# Patient Record
Sex: FEMALE | Race: WHITE | Employment: UNEMPLOYED | URBAN - NONMETROPOLITAN AREA
[De-identification: names, ages, dates, MRNs, and addresses within clinical notes are randomized per-mention and may not be internally consistent; named-entity substitution may affect disease eponyms.]

---

## 2018-08-28 ENCOUNTER — OFFICE VISIT (OUTPATIENT)
Dept: ORTHOPEDIC SURGERY | Age: 58
End: 2018-08-28
Payer: MEDICAID

## 2018-08-28 VITALS
OXYGEN SATURATION: 98 % | HEART RATE: 57 BPM | WEIGHT: 171 LBS | SYSTOLIC BLOOD PRESSURE: 122 MMHG | DIASTOLIC BLOOD PRESSURE: 62 MMHG | HEIGHT: 63 IN | BODY MASS INDEX: 30.3 KG/M2

## 2018-08-28 DIAGNOSIS — G89.29 CHRONIC PAIN OF RIGHT KNEE: Primary | ICD-10-CM

## 2018-08-28 DIAGNOSIS — M25.561 CHRONIC PAIN OF RIGHT KNEE: Primary | ICD-10-CM

## 2018-08-28 PROCEDURE — 3017F COLORECTAL CA SCREEN DOC REV: CPT | Performed by: PHYSICIAN ASSISTANT

## 2018-08-28 PROCEDURE — G8427 DOCREV CUR MEDS BY ELIG CLIN: HCPCS | Performed by: PHYSICIAN ASSISTANT

## 2018-08-28 PROCEDURE — 99212 OFFICE O/P EST SF 10 MIN: CPT | Performed by: PHYSICIAN ASSISTANT

## 2018-08-28 PROCEDURE — G8417 CALC BMI ABV UP PARAM F/U: HCPCS | Performed by: PHYSICIAN ASSISTANT

## 2018-08-28 PROCEDURE — 1036F TOBACCO NON-USER: CPT | Performed by: PHYSICIAN ASSISTANT

## 2018-09-18 ENCOUNTER — OFFICE VISIT (OUTPATIENT)
Dept: ORTHOPEDIC SURGERY | Age: 58
End: 2018-09-18
Payer: COMMERCIAL

## 2018-09-18 VITALS
BODY MASS INDEX: 31.01 KG/M2 | SYSTOLIC BLOOD PRESSURE: 124 MMHG | WEIGHT: 175 LBS | HEIGHT: 63 IN | DIASTOLIC BLOOD PRESSURE: 70 MMHG | HEART RATE: 60 BPM

## 2018-09-18 DIAGNOSIS — M17.11 PRIMARY OSTEOARTHRITIS OF RIGHT KNEE: Primary | ICD-10-CM

## 2018-09-18 PROCEDURE — 99212 OFFICE O/P EST SF 10 MIN: CPT | Performed by: NURSE PRACTITIONER

## 2018-09-18 PROCEDURE — 20610 DRAIN/INJ JOINT/BURSA W/O US: CPT | Performed by: NURSE PRACTITIONER

## 2018-09-18 PROCEDURE — G8417 CALC BMI ABV UP PARAM F/U: HCPCS | Performed by: NURSE PRACTITIONER

## 2018-09-18 PROCEDURE — 3017F COLORECTAL CA SCREEN DOC REV: CPT | Performed by: NURSE PRACTITIONER

## 2018-09-18 PROCEDURE — G8427 DOCREV CUR MEDS BY ELIG CLIN: HCPCS | Performed by: NURSE PRACTITIONER

## 2018-09-18 PROCEDURE — 1036F TOBACCO NON-USER: CPT | Performed by: NURSE PRACTITIONER

## 2020-09-01 ENCOUNTER — HOSPITAL ENCOUNTER (OUTPATIENT)
Dept: GENERAL RADIOLOGY | Age: 60
Discharge: HOME | End: 2020-09-03
Payer: MEDICAID

## 2020-09-01 ENCOUNTER — OFFICE VISIT (OUTPATIENT)
Dept: ORTHOPEDIC SURGERY | Age: 60
End: 2020-09-01
Payer: MEDICAID

## 2020-09-01 VITALS
HEIGHT: 63 IN | SYSTOLIC BLOOD PRESSURE: 130 MMHG | WEIGHT: 175 LBS | BODY MASS INDEX: 31.01 KG/M2 | HEART RATE: 80 BPM | DIASTOLIC BLOOD PRESSURE: 80 MMHG

## 2020-09-01 PROCEDURE — 73562 X-RAY EXAM OF KNEE 3: CPT

## 2020-09-01 PROCEDURE — 99212 OFFICE O/P EST SF 10 MIN: CPT | Performed by: PHYSICIAN ASSISTANT

## 2020-09-01 PROCEDURE — 1036F TOBACCO NON-USER: CPT | Performed by: PHYSICIAN ASSISTANT

## 2020-09-01 PROCEDURE — 99213 OFFICE O/P EST LOW 20 MIN: CPT

## 2020-09-01 PROCEDURE — 3017F COLORECTAL CA SCREEN DOC REV: CPT | Performed by: PHYSICIAN ASSISTANT

## 2020-09-01 PROCEDURE — G8419 CALC BMI OUT NRM PARAM NOF/U: HCPCS | Performed by: PHYSICIAN ASSISTANT

## 2020-09-01 PROCEDURE — G8427 DOCREV CUR MEDS BY ELIG CLIN: HCPCS | Performed by: PHYSICIAN ASSISTANT

## 2020-09-01 RX ORDER — GEMFIBROZIL 600 MG/1
600 TABLET, FILM COATED ORAL 2 TIMES DAILY
COMMUNITY
Start: 2020-08-31 | End: 2021-03-16

## 2020-09-01 RX ORDER — LACTOBACILLUS ACIDOPHILUS 500MM CELL
1 CAPSULE ORAL DAILY
COMMUNITY

## 2020-09-01 RX ORDER — LISINOPRIL AND HYDROCHLOROTHIAZIDE 12.5; 1 MG/1; MG/1
1 TABLET ORAL DAILY
COMMUNITY
End: 2020-09-15

## 2020-09-01 RX ORDER — FAMOTIDINE 20 MG/1
20 TABLET, FILM COATED ORAL 2 TIMES DAILY
COMMUNITY
Start: 2020-08-18 | End: 2021-03-16

## 2020-09-01 NOTE — PROGRESS NOTES
Orthopaedic Progress Note      CHIEF COMPLAINT: Right knee pain    HISTORY OF PRESENT ILLNESS:       Ms. Shamir Restrepo  is a 64 y.o. female  who presents with a long history of ongoing right knee pain. We have seen her in this clinic on several different occasions. She has pain localized the anterior medial aspect of her right knee worse with any activity relieved with rest.  At time she had an MRI of her right knee revealed advanced significant cartilage loss medial compartment the knee. Since her last visit she is been in [place]. She had a right knee operative arthroscopy done down there. She also had Orthovisc injections done down there. States last one was several months ago. The pain is back 7 out of 10 on a pain scale localized the knee no radiation she is interested in having repeat Visco supplementation injections done at this time.       Past Medical History:    Past Medical History:   Diagnosis Date    Diabetes mellitus ([address]     GERD (gastroesophageal reflux disease)     Heart murmur     Hyperlipidemia     Hypertension     Mitral valve regurgitation     S/P arthroscopy of right knee        Past Surgical History:    Past Surgical History:   Procedure Laterality Date    BUNIONECTOMY Bilateral     CARPAL TUNNEL RELEASE Bilateral     CHOLECYSTECTOMY      COLONOSCOPY      FINGER TRIGGER RELEASE Left 2010    Left thumb    KNEE ARTHROSCOPY Right     w/ menisectomy    KNEE ARTHROSCOPY Right 01/24/2020         Current  Medications:  Current Outpatient Medications   Medication Sig Dispense Refill    metFORMIN (GLUCOPHAGE) 500 MG tablet Take 500 mg by mouth 2 times daily      gemfibrozil (LOPID) 600 MG tablet Take 600 mg by mouth 2 times daily      famotidine (PEPCID) 20 MG tablet Take 20 mg by mouth 2 times daily      lisinopril-hydroCHLOROthiazide (PRINZIDE;ZESTORETIC) 10-12.5 MG per tablet Take 1 tablet by mouth daily      Acidophilus Lactobacillus CAPS Take 1 tablet by mouth daily  Multiple Vitamin (MULTI VITAMIN DAILY PO) Take by mouth      atenolol (TENORMIN) 50 MG tablet Take 50 mg by mouth daily      loratadine (CLARITIN) 10 MG capsule Take 10 mg by mouth as needed       No current facility-administered medications for this visit. Allergies:  Fluticasone and Hydrocodone-acetaminophen    Social History:   Social History     Tobacco Use   Smoking Status Never Smoker   Smokeless Tobacco Never Used     Social History     Substance and Sexual Activity   Alcohol Use No     Social History     Substance and Sexual Activity   Drug Use No       Family History:  History reviewed. No pertinent family history. REVIEW OF SYSTEMS:  Constitutional: Denies any fever, chills. Musculoskeletal: Positive for chronic right knee pain. PHYSICAL EXAM:  [unfilled]  General appearance:  Alert and oriented x 3. No apparent distress, appears stated age, calm and cooperative. Musculoskeletal: Right knee was inspected skin is good repair there is no erythema increased warmth or cellulitis. Small joint effusion knee motion is full extension flexion around 120 degrees significant crepitus is noted throughout range of motion medial joint line tenderness noted palpation good stability varus valgus anterior and posterior stresses negative Lockman negative anterior drawer negative Samir's test she has no calf pain good total extension she neurovascular intact the foot. DATA:  CBC: No results found for: WBC, HGB, PLT  BMP:  No results found for: NA, K, CL, CO2, BUN, CREATININE, CALCIUM, GLUCOSE  PT/INR:  No results found for: PROTIME, INR  Troponin:  No results found for: TROPONINI  No results for input(s): LIPASE, AMYLASE in the last 72 hours. No results for input(s): AST, ALT, BILIDIR, BILITOT, ALKPHOS in the last 72 hours.   Uric Acid:  No components found for: URIC  Urine Culture:  No components found for: ZEKE    Radiology:   Xr Knee Right (3 Views)    Result Date: 9/1/2020  EXAMINATION: THREE XRAY VIEWS OF THE RIGHT KNEE 9/1/2020 7:59 am COMPARISON: None. HISTORY: ORDERING SYSTEM PROVIDED HISTORY: Chronic pain of right knee TECHNOLOGIST PROVIDED HISTORY: pain Reason for Exam: C/o chronic knee pain, hx torn meniscus Acuity: Chronic Type of Exam: Unknown FINDINGS: Mild medial compartmental joint space narrowing. Mild patellofemoral spurring. Trace patellofemoral effusion. No fracture identified. Mild bicompartmental degenerative changes. Trace patellofemoral effusion. No fracture identified. X-rays personally reviewed by me of her right knee 3 views reveal mild to moderate degenerative joint disease with no acute bony abnormalities appreciated. Diagnosis Orders   1. Chronic pain of right knee           PLAN:  At this point she would like to do repeat Visco supplementation injections in that right knee will need to get these approved through her insurance we will call her when it is done. No orders of the defined types were placed in this encounter.        Procedure:        Electronically signed by Radha Daniel PA-C on 9/1/2020 at 11:10 AM

## 2020-09-08 ENCOUNTER — OFFICE VISIT (OUTPATIENT)
Dept: ORTHOPEDIC SURGERY | Age: 60
End: 2020-09-08
Payer: MEDICAID

## 2020-09-08 VITALS
SYSTOLIC BLOOD PRESSURE: 135 MMHG | HEART RATE: 90 BPM | HEIGHT: 63 IN | BODY MASS INDEX: 31.01 KG/M2 | DIASTOLIC BLOOD PRESSURE: 82 MMHG | WEIGHT: 175 LBS

## 2020-09-08 PROCEDURE — 99213 OFFICE O/P EST LOW 20 MIN: CPT

## 2020-09-08 PROCEDURE — 99213 OFFICE O/P EST LOW 20 MIN: CPT | Performed by: NURSE PRACTITIONER

## 2020-09-08 PROCEDURE — 20610 DRAIN/INJ JOINT/BURSA W/O US: CPT | Performed by: NURSE PRACTITIONER

## 2020-09-08 PROCEDURE — 1036F TOBACCO NON-USER: CPT | Performed by: NURSE PRACTITIONER

## 2020-09-08 PROCEDURE — 3017F COLORECTAL CA SCREEN DOC REV: CPT | Performed by: NURSE PRACTITIONER

## 2020-09-08 PROCEDURE — G8427 DOCREV CUR MEDS BY ELIG CLIN: HCPCS | Performed by: NURSE PRACTITIONER

## 2020-09-08 PROCEDURE — G8417 CALC BMI ABV UP PARAM F/U: HCPCS | Performed by: NURSE PRACTITIONER

## 2020-09-08 RX ADMIN — Medication 30 MG: at 08:14

## 2020-09-08 NOTE — PROGRESS NOTES
Orthopaedic Progress Note      CHIEF COMPLAINT: Right knee pain    HISTORY OF PRESENT ILLNESS:       Ms. Dominic Ozuna  is a 64 y.o. female  who presents with right knee pain. She was approved through her insurance for an Orthovisc 1 injection into her right knee. She does have a history of obtaining these injections in the past.  She states her pain is worse with activity relieved with rest.  She did have a MRI of her right knee which did reveal advanced significant cartilage loss of medial compartments in the knee. She is also been seen in [place] for previous orthopedic visits also. She states she did have a right knee arthroscopy surgery in [place]. Pain is worse with prolonged activity walking standing going up and down stairs getting up from a seated position.       Past Medical History:    Past Medical History:   Diagnosis Date    Diabetes mellitus ([address]     GERD (gastroesophageal reflux disease)     Heart murmur     Hyperlipidemia     Hypertension     Mitral valve regurgitation     S/P arthroscopy of right knee        Past Surgical History:    Past Surgical History:   Procedure Laterality Date    BUNIONECTOMY Bilateral     CARPAL TUNNEL RELEASE Bilateral     CHOLECYSTECTOMY      COLONOSCOPY      FINGER TRIGGER RELEASE Left 2010    Left thumb    KNEE ARTHROSCOPY Right     w/ menisectomy    KNEE ARTHROSCOPY Right 01/24/2020         Current  Medications:  Current Outpatient Medications   Medication Sig Dispense Refill    metFORMIN (GLUCOPHAGE) 500 MG tablet Take 500 mg by mouth 2 times daily      gemfibrozil (LOPID) 600 MG tablet Take 600 mg by mouth 2 times daily      famotidine (PEPCID) 20 MG tablet Take 20 mg by mouth 2 times daily      lisinopril-hydroCHLOROthiazide (PRINZIDE;ZESTORETIC) 10-12.5 MG per tablet Take 1 tablet by mouth daily      Acidophilus Lactobacillus CAPS Take 1 tablet by mouth daily      Multiple Vitamin (MULTI VITAMIN DAILY PO) Take by mouth      atenolol (TENORMIN) 50 MG tablet Take 50 mg by mouth daily      loratadine (CLARITIN) 10 MG capsule Take 10 mg by mouth as needed       No current facility-administered medications for this visit. Allergies:  Fluticasone and Hydrocodone-acetaminophen    Social History:   Social History     Tobacco Use   Smoking Status Never Smoker   Smokeless Tobacco Never Used     Social History     Substance and Sexual Activity   Alcohol Use No     Social History     Substance and Sexual Activity   Drug Use No       Family History:  History reviewed. No pertinent family history. REVIEW OF SYSTEMS:  Constitutional: Denies any fever, chills. Musculoskeletal: Positive for right knee pain. PHYSICAL EXAM:  [unfilled]  General appearance:  Alert and oriented x 3. No apparent distress, appears stated age, calm and cooperative. Musculoskeletal: Right lower extremity was examined. Skin is intact no rashes lesions or drainage. No redness warmth or cellulitis. No joint effusion is noted. Good range of motion of the knee she can get full extension flexion to around 120 degrees. Crepitus noted with range of motion. Good stability to varus and valgus stress testing. Negative Lachemann's. Negative Samir's. No calf pain to palpation. Toe flexion and extension is intact. She has medial joint line tenderness to palpation. Dylan Saavedra DATA:  CBC: No results found for: WBC, HGB, PLT  BMP:  No results found for: NA, K, CL, CO2, BUN, CREATININE, CALCIUM, GLUCOSE  PT/INR:  No results found for: PROTIME, INR  Troponin:  No results found for: TROPONINI  No results for input(s): LIPASE, AMYLASE in the last 72 hours. No results for input(s): AST, ALT, BILIDIR, BILITOT, ALKPHOS in the last 72 hours. Uric Acid:  No components found for: URIC  Urine Culture:  No components found for: JOHNSONINE    Radiology:   Xr Knee Right (3 Views)    Result Date: 9/1/2020  EXAMINATION: THREE XRAY VIEWS OF THE RIGHT KNEE 9/1/2020 7:59 am COMPARISON: None. HISTORY: ORDERING SYSTEM PROVIDED HISTORY: Chronic pain of right knee TECHNOLOGIST PROVIDED HISTORY: pain Reason for Exam: C/o chronic knee pain, hx torn meniscus Acuity: Chronic Type of Exam: Unknown FINDINGS: Mild medial compartmental joint space narrowing. Mild patellofemoral spurring. Trace patellofemoral effusion. No fracture identified. Mild bicompartmental degenerative changes. Trace patellofemoral effusion. No fracture identified. X-rays personally reviewed by me of x-rays 3 views of the right knee does show medial compartment osteoarthritis. No acute fractures. Diagnosis Orders   1. Chronic pain of right knee  [place] ARTHROCENTESIS ASPIR&/INJ MAJOR JT/BURSA W/O US    hyaluronan (ORTHOVISC) 30 MG/2ML injection 30 mg         PLAN:  Plan at this time patient is approved for an Orthovisc 1 injection into her right knee. Plan for injection today. See her back next week for her second injection. Activities as tolerated weightbearing as tolerated. No orders of the defined types were placed in this encounter. Procedure: Right knee was prepped in sterile fashion with alcohol. A 22-gauge needle was introduced into the right inferolateral portal the knee. 5 mils of half percent Marcaine 80 mg of Kenalog were injected. Hemostasis achieved dry sterile bandage applied. Patient tolerated procedure well.       Electronically signed by NATHANIEL Freire CNP on 9/8/2020 at 7:54 AM

## 2020-09-15 ENCOUNTER — OFFICE VISIT (OUTPATIENT)
Dept: ORTHOPEDIC SURGERY | Age: 60
End: 2020-09-15
Payer: MEDICAID

## 2020-09-15 VITALS
HEIGHT: 63 IN | RESPIRATION RATE: 16 BRPM | OXYGEN SATURATION: 98 % | DIASTOLIC BLOOD PRESSURE: 92 MMHG | HEART RATE: 70 BPM | BODY MASS INDEX: 31.01 KG/M2 | SYSTOLIC BLOOD PRESSURE: 154 MMHG | WEIGHT: 175 LBS

## 2020-09-15 PROCEDURE — 1036F TOBACCO NON-USER: CPT | Performed by: PHYSICIAN ASSISTANT

## 2020-09-15 PROCEDURE — 99212 OFFICE O/P EST SF 10 MIN: CPT

## 2020-09-15 PROCEDURE — 3017F COLORECTAL CA SCREEN DOC REV: CPT | Performed by: PHYSICIAN ASSISTANT

## 2020-09-15 PROCEDURE — 20610 DRAIN/INJ JOINT/BURSA W/O US: CPT | Performed by: PHYSICIAN ASSISTANT

## 2020-09-15 PROCEDURE — 99212 OFFICE O/P EST SF 10 MIN: CPT | Performed by: PHYSICIAN ASSISTANT

## 2020-09-15 PROCEDURE — G8427 DOCREV CUR MEDS BY ELIG CLIN: HCPCS | Performed by: PHYSICIAN ASSISTANT

## 2020-09-15 PROCEDURE — G8417 CALC BMI ABV UP PARAM F/U: HCPCS | Performed by: PHYSICIAN ASSISTANT

## 2020-09-15 RX ADMIN — Medication 30 MG: at 12:44

## 2020-09-15 SDOH — HEALTH STABILITY: MENTAL HEALTH: HOW OFTEN DO YOU HAVE A DRINK CONTAINING ALCOHOL?: NEVER

## 2020-09-15 NOTE — PROGRESS NOTES
Orthopaedic Progress Note      CHIEF COMPLAINT: Right knee osteoarthritis    HISTORY OF PRESENT ILLNESS:       Ms. Kim Barrera  is a 64 y.o. female  who presents with right knee osteoarthritis would like to proceed with Orthovisc No. 2 of the series of 3 patient stated that she did get mild relief from the first injection. Past Medical History:    Past Medical History:   Diagnosis Date    Diabetes mellitus ([address]     GERD (gastroesophageal reflux disease)     Heart murmur     Hyperlipidemia     Hypertension     Mitral valve regurgitation     S/P arthroscopy of right knee        Past Surgical History:    Past Surgical History:   Procedure Laterality Date    BUNIONECTOMY Bilateral     CARPAL TUNNEL RELEASE Bilateral     CHOLECYSTECTOMY      COLONOSCOPY      FINGER TRIGGER RELEASE Left 2010    Left thumb    KNEE ARTHROSCOPY Right     w/ menisectomy    KNEE ARTHROSCOPY Right 01/24/2020         Current  Medications:  Current Outpatient Medications   Medication Sig Dispense Refill    metFORMIN (GLUCOPHAGE) 500 MG tablet Take 500 mg by mouth 2 times daily      gemfibrozil (LOPID) 600 MG tablet Take 600 mg by mouth 2 times daily      famotidine (PEPCID) 20 MG tablet Take 20 mg by mouth 2 times daily      Acidophilus Lactobacillus CAPS Take 1 tablet by mouth daily      Multiple Vitamin (MULTI VITAMIN DAILY PO) Take by mouth      atenolol (TENORMIN) 50 MG tablet Take 50 mg by mouth daily      loratadine (CLARITIN) 10 MG capsule Take 10 mg by mouth as needed       No current facility-administered medications for this visit.         Allergies:  Fluticasone and Hydrocodone-acetaminophen    Social History:   Social History     Tobacco Use   Smoking Status Never Smoker   Smokeless Tobacco Never Used     Social History     Substance and Sexual Activity   Alcohol Use No    Frequency: Never    Binge frequency: Never     Social History     Substance and Sexual Activity   Drug Use No       Family History:  No family history on file. REVIEW OF SYSTEMS:  Constitutional: Denies any fever, chills. Musculoskeletal: Positive for osteoarthritis right knee. PHYSICAL EXAM:  [unfilled]  General appearance:  Alert and oriented x 3. No apparent distress, appears stated age, calm and cooperative. Musculoskeletal: Pain with range of motion crepitus noted flexion-extension. DATA:  CBC: No results found for: WBC, HGB, PLT  BMP:  No results found for: NA, K, CL, CO2, BUN, CREATININE, CALCIUM, GLUCOSE  PT/INR:  No results found for: PROTIME, INR  Troponin:  No results found for: TROPONINI  No results for input(s): LIPASE, AMYLASE in the last 72 hours. No results for input(s): AST, ALT, BILIDIR, BILITOT, ALKPHOS in the last 72 hours. Uric Acid:  No components found for: URIC  Urine Culture:  No components found for: CURINE    Radiology:   Xr Knee Right (3 Views)    Result Date: 9/1/2020  EXAMINATION: THREE XRAY VIEWS OF THE RIGHT KNEE 9/1/2020 7:59 am COMPARISON: None. HISTORY: ORDERING SYSTEM PROVIDED HISTORY: Chronic pain of right knee TECHNOLOGIST PROVIDED HISTORY: pain Reason for Exam: C/o chronic knee pain, hx torn meniscus Acuity: Chronic Type of Exam: Unknown FINDINGS: Mild medial compartmental joint space narrowing. Mild patellofemoral spurring. Trace patellofemoral effusion. No fracture identified. Mild bicompartmental degenerative changes. Trace patellofemoral effusion. No fracture identified. X-rays personally reviewed by me of osteoarthritis       Diagnosis Orders   1. Chronic pain of right knee  [place] ARTHROCENTESIS ASPIR&/INJ MAJOR JT/BURSA W/O US    hyaluronan (ORTHOVISC) 30 MG/2ML injection 30 mg         PLAN:  This time to proceed with Orthovisc no. 2 of the series of 3    No orders of the defined types were placed in this encounter.        Procedure:    Risk benefits discussed with patient patient like to proceed patient prepped in sterile fashion with alcohol Betadine Orthovisc No. 2 of the series of 3 was injected into the right inferolateral portal patient handled procedure well    Electronically signed by Anjali Worthy PA-C on 9/15/2020 at 12:22 PM

## 2020-09-22 ENCOUNTER — OFFICE VISIT (OUTPATIENT)
Dept: ORTHOPEDIC SURGERY | Age: 60
End: 2020-09-22
Payer: MEDICAID

## 2020-09-22 VITALS
HEART RATE: 70 BPM | DIASTOLIC BLOOD PRESSURE: 64 MMHG | WEIGHT: 175 LBS | SYSTOLIC BLOOD PRESSURE: 122 MMHG | HEIGHT: 63 IN | OXYGEN SATURATION: 98 % | BODY MASS INDEX: 31.01 KG/M2

## 2020-09-22 PROCEDURE — 3017F COLORECTAL CA SCREEN DOC REV: CPT | Performed by: PHYSICIAN ASSISTANT

## 2020-09-22 PROCEDURE — 99212 OFFICE O/P EST SF 10 MIN: CPT | Performed by: PHYSICIAN ASSISTANT

## 2020-09-22 PROCEDURE — 20610 DRAIN/INJ JOINT/BURSA W/O US: CPT | Performed by: PHYSICIAN ASSISTANT

## 2020-09-22 PROCEDURE — G8417 CALC BMI ABV UP PARAM F/U: HCPCS | Performed by: PHYSICIAN ASSISTANT

## 2020-09-22 PROCEDURE — 99213 OFFICE O/P EST LOW 20 MIN: CPT

## 2020-09-22 PROCEDURE — G8427 DOCREV CUR MEDS BY ELIG CLIN: HCPCS | Performed by: PHYSICIAN ASSISTANT

## 2020-09-22 PROCEDURE — 1036F TOBACCO NON-USER: CPT | Performed by: PHYSICIAN ASSISTANT

## 2020-09-22 RX ADMIN — Medication 30 MG: at 08:25

## 2020-09-22 NOTE — PROGRESS NOTES
Orthopaedic Progress Note      CHIEF COMPLAINT: Right knee pain    HISTORY OF PRESENT ILLNESS:       Ms. Nnamdi Chavez  is a 64 y.o. female  who presents with a long history of ongoing right knee pain. She has a diagnosis of primary osteoarthritis of the right knee. He is here today for her third in a series of 3 Orthovisc injections into the right knee. He states that these of helped significantly so far she would like to proceed with the third injection. Past Medical History:    Past Medical History:   Diagnosis Date    Diabetes mellitus ([address]     GERD (gastroesophageal reflux disease)     Heart murmur     Hyperlipidemia     Hypertension     Mitral valve regurgitation     S/P arthroscopy of right knee        Past Surgical History:    Past Surgical History:   Procedure Laterality Date    BUNIONECTOMY Bilateral     CARPAL TUNNEL RELEASE Bilateral     CHOLECYSTECTOMY      COLONOSCOPY      FINGER TRIGGER RELEASE Left 2010    Left thumb    KNEE ARTHROSCOPY Right     w/ menisectomy    KNEE ARTHROSCOPY Right 01/24/2020         Current  Medications:  Current Outpatient Medications   Medication Sig Dispense Refill    metFORMIN (GLUCOPHAGE) 500 MG tablet Take 500 mg by mouth 2 times daily      gemfibrozil (LOPID) 600 MG tablet Take 600 mg by mouth 2 times daily      famotidine (PEPCID) 20 MG tablet Take 20 mg by mouth 2 times daily      Acidophilus Lactobacillus CAPS Take 1 tablet by mouth daily      Multiple Vitamin (MULTI VITAMIN DAILY PO) Take by mouth      atenolol (TENORMIN) 50 MG tablet Take 100 mg by mouth daily       loratadine (CLARITIN) 10 MG capsule Take 10 mg by mouth as needed       No current facility-administered medications for this visit.         Allergies:  Fluticasone and Hydrocodone-acetaminophen    Social History:   Social History     Tobacco Use   Smoking Status Never Smoker   Smokeless Tobacco Never Used     Social History     Substance and Sexual Activity   Alcohol Use No    Frequency: Never    Binge frequency: Never     Social History     Substance and Sexual Activity   Drug Use No       Family History:  History reviewed. No pertinent family history. REVIEW OF SYSTEMS:  Constitutional: Denies any fever, chills. Musculoskeletal: Positive for right knee osteoarthritis. PHYSICAL EXAM:  [unfilled]  General appearance:  Alert and oriented x 3. No apparent distress, appears stated age, calm and cooperative. Musculoskeletal: Right knee was inspected skin is good repair no erythema increased warmth or cellulitis. Lee Mckenna DATA:  CBC: No results found for: WBC, HGB, PLT  BMP:  No results found for: NA, K, CL, CO2, BUN, CREATININE, CALCIUM, GLUCOSE  PT/INR:  No results found for: PROTIME, INR  Troponin:  No results found for: TROPONINI  No results for input(s): LIPASE, AMYLASE in the last 72 hours. No results for input(s): AST, ALT, BILIDIR, BILITOT, ALKPHOS in the last 72 hours. Uric Acid:  No components found for: URIC  Urine Culture:  No components found for: CURINE    Radiology:   Xr Knee Right (3 Views)    Result Date: 9/1/2020  EXAMINATION: THREE XRAY VIEWS OF THE RIGHT KNEE 9/1/2020 7:59 am COMPARISON: None. HISTORY: ORDERING SYSTEM PROVIDED HISTORY: Chronic pain of right knee TECHNOLOGIST PROVIDED HISTORY: pain Reason for Exam: C/o chronic knee pain, hx torn meniscus Acuity: Chronic Type of Exam: Unknown FINDINGS: Mild medial compartmental joint space narrowing. Mild patellofemoral spurring. Trace patellofemoral effusion. No fracture identified. Mild bicompartmental degenerative changes. Trace patellofemoral effusion. No fracture identified. X-rays personally reviewed by me of reveal primary osteoarthritis of the right knee. Diagnosis Orders   1.  Chronic pain of right knee  [place] ARTHROCENTESIS ASPIR&/INJ MAJOR JT/BURSA W/O US    hyaluronan (ORTHOVISC) 30 MG/2ML injection 30 mg         PLAN:  Proceed with a Orthovisc injection #3    No orders of the defined types were placed in this encounter.        Procedure:    Utilizing sterile technique the anterolateral portal of the right knee was washed with alcohol pad a 22-gauge needle was used to introduce standard prefilled Orthovisc syringe into the right knee joint was well-tolerated good hemostasis no dry sterile bandage applied    Electronically signed by Santana Shearer PA-C on 9/22/2020 at 8:17 AM

## 2021-03-16 ENCOUNTER — OFFICE VISIT (OUTPATIENT)
Dept: ORTHOPEDIC SURGERY | Age: 61
End: 2021-03-16
Payer: COMMERCIAL

## 2021-03-16 VITALS
SYSTOLIC BLOOD PRESSURE: 128 MMHG | DIASTOLIC BLOOD PRESSURE: 80 MMHG | BODY MASS INDEX: 31.01 KG/M2 | WEIGHT: 175 LBS | HEART RATE: 57 BPM | HEIGHT: 63 IN

## 2021-03-16 DIAGNOSIS — G89.29 CHRONIC PAIN OF RIGHT KNEE: Primary | ICD-10-CM

## 2021-03-16 DIAGNOSIS — M25.561 CHRONIC PAIN OF RIGHT KNEE: Primary | ICD-10-CM

## 2021-03-16 PROCEDURE — G8484 FLU IMMUNIZE NO ADMIN: HCPCS | Performed by: PHYSICIAN ASSISTANT

## 2021-03-16 PROCEDURE — 99213 OFFICE O/P EST LOW 20 MIN: CPT | Performed by: PHYSICIAN ASSISTANT

## 2021-03-16 PROCEDURE — G8417 CALC BMI ABV UP PARAM F/U: HCPCS | Performed by: PHYSICIAN ASSISTANT

## 2021-03-16 PROCEDURE — 99214 OFFICE O/P EST MOD 30 MIN: CPT

## 2021-03-16 PROCEDURE — 3017F COLORECTAL CA SCREEN DOC REV: CPT | Performed by: PHYSICIAN ASSISTANT

## 2021-03-16 PROCEDURE — 1036F TOBACCO NON-USER: CPT | Performed by: PHYSICIAN ASSISTANT

## 2021-03-16 PROCEDURE — G8427 DOCREV CUR MEDS BY ELIG CLIN: HCPCS | Performed by: PHYSICIAN ASSISTANT

## 2021-03-16 RX ORDER — GEMFIBROZIL 600 MG/1
600 TABLET, FILM COATED ORAL 2 TIMES DAILY
COMMUNITY
Start: 2020-08-31

## 2021-03-16 RX ORDER — CARVEDILOL 25 MG/1
25 TABLET ORAL 2 TIMES DAILY
COMMUNITY

## 2021-03-16 RX ORDER — ASPIRIN 81 MG/1
81 TABLET ORAL DAILY
COMMUNITY

## 2021-03-16 RX ORDER — FAMOTIDINE 40 MG/1
40 TABLET, FILM COATED ORAL 2 TIMES DAILY
COMMUNITY
Start: 2021-03-11 | End: 2022-03-11

## 2021-03-16 RX ORDER — ROSUVASTATIN CALCIUM 10 MG/1
10 TABLET, COATED ORAL DAILY
COMMUNITY

## 2021-03-16 NOTE — PROGRESS NOTES
Orthopaedic Progress Note      CHIEF COMPLAINT: Right knee pain    HISTORY OF PRESENT ILLNESS:       Ms. Dimitrios Herman  is a 64 y.o. female  who presents with history of osteoarthritis right knee she is underwent Orthovisc injections back in September 2020. She would like to proceed with another round of injections at this time. She did state that that helped out significantly. Past Medical History:    Past Medical History:   Diagnosis Date    Diabetes mellitus ([address]     GERD (gastroesophageal reflux disease)     Heart murmur     Hyperlipidemia     Hypertension     Mitral valve regurgitation     S/P arthroscopy of right knee        Past Surgical History:    Past Surgical History:   Procedure Laterality Date    BUNIONECTOMY Bilateral     CARPAL TUNNEL RELEASE Bilateral     CHOLECYSTECTOMY      COLONOSCOPY      FINGER TRIGGER RELEASE Left 2010    Left thumb    KNEE ARTHROSCOPY Right     w/ menisectomy    KNEE ARTHROSCOPY Right 01/24/2020         Current  Medications:  Current Outpatient Medications   Medication Sig Dispense Refill    aspirin 81 MG EC tablet Take 81 mg by mouth daily      Biotin 81726 MCG TBDP Take 1 tablet by mouth daily      carvedilol (COREG) 25 MG tablet Take 25 mg by mouth 2 times daily      famotidine (PEPCID) 40 MG tablet Take 40 mg by mouth 2 times daily      gemfibrozil (LOPID) 600 MG tablet Take 600 mg by mouth 2 times daily      metFORMIN (GLUCOPHAGE) 500 MG tablet Take 500 mg by mouth      lipase-protease-amylase (CREON) 85433-43124 units delayed release capsule Take 1 capsule by mouth      rosuvastatin (CRESTOR) 10 MG tablet Take 10 mg by mouth daily      Acidophilus Lactobacillus CAPS Take 1 tablet by mouth daily      Multiple Vitamin (MULTI VITAMIN DAILY PO) Take by mouth      loratadine (CLARITIN) 10 MG capsule Take 10 mg by mouth as needed       No current facility-administered medications for this visit.         Allergies:  Fluticasone and Hydrocodone-acetaminophen    Social History:   Social History     Tobacco Use   Smoking Status Never Smoker   Smokeless Tobacco Never Used     Social History     Substance and Sexual Activity   Alcohol Use No    Frequency: Never    Binge frequency: Never     Social History     Substance and Sexual Activity   Drug Use No       Family History:  History reviewed. No pertinent family history. REVIEW OF SYSTEMS:  Constitutional: Denies any fever, chills. Musculoskeletal: Positive for right knee pain. PHYSICAL EXAM:  [unfilled]  General appearance:  Alert and oriented x 3. No apparent distress, appears stated age, calm and cooperative. Musculoskeletal: Increasing pain getting up and seated position does have tenderness palpation along the medial joint line. Does have significant crepitus of flexion-extension knee negative valgus varus stress testing. DATA:  CBC: No results found for: WBC, HGB, PLT  BMP:  No results found for: NA, K, CL, CO2, BUN, CREATININE, CALCIUM, GLUCOSE  PT/INR:  No results found for: PROTIME, INR  Troponin:  No results found for: TROPONINI  No results for input(s): LIPASE, AMYLASE in the last 72 hours. No results for input(s): AST, ALT, BILIDIR, BILITOT, ALKPHOS in the last 72 hours. Uric Acid:  No components found for: URIC  Urine Culture:  No components found for: CURINE    Radiology:   No results found. X-rays personally reviewed by me of osteoarthritis primary right knee medial compartment       Diagnosis Orders   1. Chronic pain of right knee           PLAN:  This time feel this patient benefit from undergoing Orthovisc injection risk benefits cussed with patient patient like to proceed we will see if this can get this approved through her insurance once is completed we will discuss getting her set up for the injections    No orders of the defined types were placed in this encounter.        Procedure:        Electronically signed by Mary Ellen Keating PA-C on 3/16/2021 at 9:39 AM

## 2021-04-20 ENCOUNTER — OFFICE VISIT (OUTPATIENT)
Dept: ORTHOPEDIC SURGERY | Age: 61
End: 2021-04-20
Payer: COMMERCIAL

## 2021-04-20 VITALS
SYSTOLIC BLOOD PRESSURE: 131 MMHG | HEART RATE: 85 BPM | HEIGHT: 63 IN | BODY MASS INDEX: 31.01 KG/M2 | DIASTOLIC BLOOD PRESSURE: 80 MMHG | WEIGHT: 175 LBS

## 2021-04-20 DIAGNOSIS — M25.561 CHRONIC PAIN OF RIGHT KNEE: Primary | ICD-10-CM

## 2021-04-20 DIAGNOSIS — G89.29 CHRONIC PAIN OF RIGHT KNEE: Primary | ICD-10-CM

## 2021-04-20 PROCEDURE — G8417 CALC BMI ABV UP PARAM F/U: HCPCS | Performed by: PHYSICIAN ASSISTANT

## 2021-04-20 PROCEDURE — 20610 DRAIN/INJ JOINT/BURSA W/O US: CPT | Performed by: PHYSICIAN ASSISTANT

## 2021-04-20 PROCEDURE — 99214 OFFICE O/P EST MOD 30 MIN: CPT | Performed by: ORTHOPAEDIC SURGERY

## 2021-04-20 PROCEDURE — 1036F TOBACCO NON-USER: CPT | Performed by: PHYSICIAN ASSISTANT

## 2021-04-20 PROCEDURE — G8427 DOCREV CUR MEDS BY ELIG CLIN: HCPCS | Performed by: PHYSICIAN ASSISTANT

## 2021-04-20 PROCEDURE — 99213 OFFICE O/P EST LOW 20 MIN: CPT | Performed by: PHYSICIAN ASSISTANT

## 2021-04-20 PROCEDURE — 3017F COLORECTAL CA SCREEN DOC REV: CPT | Performed by: PHYSICIAN ASSISTANT

## 2021-04-20 RX ADMIN — Medication 30 MG: at 09:09

## 2021-04-20 NOTE — PROGRESS NOTES
Orthopaedic Progress Note      CHIEF COMPLAINT:  Right knee pain    HISTORY OF PRESENT ILLNESS:       Ms. Swapna Montana  is a 64 y.o. female  who presents with right knee primary OA. Here today for orthovisc injection series of three. Past Medical History:    Past Medical History:   Diagnosis Date    Diabetes mellitus ([address]     GERD (gastroesophageal reflux disease)     Heart murmur     Hyperlipidemia     Hypertension     Mitral valve regurgitation     S/P arthroscopy of right knee        Past Surgical History:    Past Surgical History:   Procedure Laterality Date    BUNIONECTOMY Bilateral     CARPAL TUNNEL RELEASE Bilateral     CHOLECYSTECTOMY      COLONOSCOPY      FINGER TRIGGER RELEASE Left 2010    Left thumb    KNEE ARTHROSCOPY Right     w/ menisectomy    KNEE ARTHROSCOPY Right 01/24/2020         Current  Medications:  Current Outpatient Medications   Medication Sig Dispense Refill    aspirin 81 MG EC tablet Take 81 mg by mouth daily      Biotin 86683 MCG TBDP Take 1 tablet by mouth daily      carvedilol (COREG) 25 MG tablet Take 25 mg by mouth 2 times daily      famotidine (PEPCID) 40 MG tablet Take 40 mg by mouth 2 times daily      gemfibrozil (LOPID) 600 MG tablet Take 600 mg by mouth 2 times daily      metFORMIN (GLUCOPHAGE) 500 MG tablet Take 500 mg by mouth      rosuvastatin (CRESTOR) 10 MG tablet Take 10 mg by mouth daily      Acidophilus Lactobacillus CAPS Take 1 tablet by mouth daily      Multiple Vitamin (MULTI VITAMIN DAILY PO) Take by mouth      loratadine (CLARITIN) 10 MG capsule Take 10 mg by mouth as needed      lipase-protease-amylase (CREON) 88259-87036 units delayed release capsule Take 1 capsule by mouth       No current facility-administered medications for this visit.         Allergies:  Fluticasone and Hydrocodone-acetaminophen    Social History:   Social History     Tobacco Use   Smoking Status Never Smoker   Smokeless Tobacco Never Used     Social History Substance and Sexual Activity   Alcohol Use No    Frequency: Never    Binge frequency: Never     Social History     Substance and Sexual Activity   Drug Use No       Family History:  No family history on file. REVIEW OF SYSTEMS:  Constitutional: Denies any fever, chills. Musculoskeletal: Positive for OA right knee. PHYSICAL EXAM:  [unfilled]  General appearance:  Alert and oriented x 3. No apparent distress, appears stated age, calm and cooperative. Musculoskeletal: Crepitus with right knee. Pain with range of motion as well. Negative valgus varus stress testing. Left knee was also examined having increasing crepitus with flexion-extension knee pain with any type of palpation at this time. Marta Groves DATA:  CBC: No results found for: WBC, HGB, PLT  BMP:  No results found for: NA, K, CL, CO2, BUN, CREATININE, CALCIUM, GLUCOSE  PT/INR:  No results found for: PROTIME, INR  Troponin:  No results found for: TROPONINI  No results for input(s): LIPASE, AMYLASE in the last 72 hours. No results for input(s): AST, ALT, BILIDIR, BILITOT, ALKPHOS in the last 72 hours. Uric Acid:  No components found for: URIC  Urine Culture:  No components found for: CURINE    Radiology:   No results found. X-rays personally reviewed by me of osteoarthritis right knee       Diagnosis Orders   1. Chronic pain of right knee  [place] ARTHROCENTESIS ASPIR&/INJ MAJOR JT/BURSA W/O US    hyaluronan (ORTHOVISC) 30 MG/2ML injection 30 mg         PLAN:  This time feel this patient benefit from undergoing Orthovisc injection right knee. To be first injection of the series of 3. Upon follow-up next week x-ray left knee 3 views standing    No orders of the defined types were placed in this encounter.        Procedure:    Patient prepped in sterile fashion with alcohol Orthovisc 1 the series of 3 was injected into the right inferolateral portal patient handled procedure well sterile Band-Aid was applied    Electronically signed by Katherine Dominique Yobany Darling PA-C on 4/20/2021 at 9:00 AM

## 2021-04-27 ENCOUNTER — OFFICE VISIT (OUTPATIENT)
Dept: ORTHOPEDIC SURGERY | Age: 61
End: 2021-04-27
Payer: COMMERCIAL

## 2021-04-27 ENCOUNTER — HOSPITAL ENCOUNTER (OUTPATIENT)
Dept: GENERAL RADIOLOGY | Age: 61
Discharge: HOME | End: 2021-04-29
Payer: COMMERCIAL

## 2021-04-27 VITALS
OXYGEN SATURATION: 97 % | HEIGHT: 63 IN | HEART RATE: 70 BPM | DIASTOLIC BLOOD PRESSURE: 84 MMHG | WEIGHT: 175 LBS | SYSTOLIC BLOOD PRESSURE: 122 MMHG | BODY MASS INDEX: 31.01 KG/M2

## 2021-04-27 DIAGNOSIS — M25.561 CHRONIC PAIN OF RIGHT KNEE: Primary | ICD-10-CM

## 2021-04-27 DIAGNOSIS — G89.29 CHRONIC PAIN OF RIGHT KNEE: Primary | ICD-10-CM

## 2021-04-27 DIAGNOSIS — M25.562 LEFT KNEE PAIN, UNSPECIFIED CHRONICITY: ICD-10-CM

## 2021-04-27 PROCEDURE — 20610 DRAIN/INJ JOINT/BURSA W/O US: CPT | Performed by: PHYSICIAN ASSISTANT

## 2021-04-27 PROCEDURE — 99213 OFFICE O/P EST LOW 20 MIN: CPT | Performed by: PHYSICIAN ASSISTANT

## 2021-04-27 PROCEDURE — 3017F COLORECTAL CA SCREEN DOC REV: CPT | Performed by: PHYSICIAN ASSISTANT

## 2021-04-27 PROCEDURE — 99214 OFFICE O/P EST MOD 30 MIN: CPT | Performed by: ORTHOPAEDIC SURGERY

## 2021-04-27 PROCEDURE — 1036F TOBACCO NON-USER: CPT | Performed by: PHYSICIAN ASSISTANT

## 2021-04-27 PROCEDURE — G8427 DOCREV CUR MEDS BY ELIG CLIN: HCPCS | Performed by: PHYSICIAN ASSISTANT

## 2021-04-27 PROCEDURE — 73562 X-RAY EXAM OF KNEE 3: CPT

## 2021-04-27 PROCEDURE — G8417 CALC BMI ABV UP PARAM F/U: HCPCS | Performed by: PHYSICIAN ASSISTANT

## 2021-04-27 RX ADMIN — Medication 30 MG: at 10:00

## 2021-04-27 NOTE — PROGRESS NOTES
Orthopaedic Progress Note      CHIEF COMPLAINT: Bilateral knee pain    HISTORY OF PRESENT ILLNESS:       Ms. Susie Arcos  is a 64 y.o. female  who presents with history of bilateral knee pain. She is underwent gel injection right knee x1 she is here today for second injection in the series of 3. She is also on proceed to get an x-ray of her left knee. She is having increasing difficulty getting up stabilization going up down steps.   She stated that she did not receive much benefit from the first injection of this right knee      Past Medical History:    Past Medical History:   Diagnosis Date    Diabetes mellitus ([address]     GERD (gastroesophageal reflux disease)     Heart murmur     Hyperlipidemia     Hypertension     Mitral valve regurgitation     S/P arthroscopy of right knee        Past Surgical History:    Past Surgical History:   Procedure Laterality Date    BUNIONECTOMY Bilateral     CARPAL TUNNEL RELEASE Bilateral     CHOLECYSTECTOMY      COLONOSCOPY      FINGER TRIGGER RELEASE Left 2010    Left thumb    KNEE ARTHROSCOPY Right     w/ menisectomy    KNEE ARTHROSCOPY Right 01/24/2020         Current  Medications:  Current Outpatient Medications   Medication Sig Dispense Refill    aspirin 81 MG EC tablet Take 81 mg by mouth daily      Biotin 65783 MCG TBDP Take 1 tablet by mouth daily      carvedilol (COREG) 25 MG tablet Take 25 mg by mouth 2 times daily      famotidine (PEPCID) 40 MG tablet Take 40 mg by mouth 2 times daily      gemfibrozil (LOPID) 600 MG tablet Take 600 mg by mouth 2 times daily      metFORMIN (GLUCOPHAGE) 500 MG tablet Take 500 mg by mouth      rosuvastatin (CRESTOR) 10 MG tablet Take 10 mg by mouth daily      Acidophilus Lactobacillus CAPS Take 1 tablet by mouth daily      Multiple Vitamin (MULTI VITAMIN DAILY PO) Take by mouth      loratadine (CLARITIN) 10 MG capsule Take 10 mg by mouth as needed      lipase-protease-amylase (CREON) 48273-55030 units delayed release capsule Take 1 capsule by mouth       No current facility-administered medications for this visit. Allergies:  Fluticasone and Hydrocodone-acetaminophen    Social History:   Social History     Tobacco Use   Smoking Status Never Smoker   Smokeless Tobacco Never Used     Social History     Substance and Sexual Activity   Alcohol Use No    Frequency: Never    Binge frequency: Never     Social History     Substance and Sexual Activity   Drug Use No       Family History:  History reviewed. No pertinent family history. REVIEW OF SYSTEMS:  Constitutional: Denies any fever, chills. Musculoskeletal: Positive for osteoarthritis primary bilateral knees. PHYSICAL EXAM:  [unfilled]  General appearance:  Alert and oriented x 3. No apparent distress, appears stated age, calm and cooperative. Musculoskeletal: Pain bilateral knees with range of motion crepitus noted with flexion-extension knees. DATA:  CBC: No results found for: WBC, HGB, PLT  BMP:  No results found for: NA, K, CL, CO2, BUN, CREATININE, CALCIUM, GLUCOSE  PT/INR:  No results found for: PROTIME, INR  Troponin:  No results found for: TROPONINI  No results for input(s): LIPASE, AMYLASE in the last 72 hours. No results for input(s): AST, ALT, BILIDIR, BILITOT, ALKPHOS in the last 72 hours. Uric Acid:  No components found for: URIC  Urine Culture:  No components found for: CURINE    Radiology:   No results found. X-rays pending for left knee right knee does show underlying osteoarthritis       Diagnosis Orders   1. Chronic pain of right knee  [place] ARTHROCENTESIS ASPIR&/INJ MAJOR JT/BURSA W/O US    hyaluronan (ORTHOVISC) 30 MG/2ML injection 30 mg   2. Left knee pain, unspecified chronicity  XR KNEE LEFT (3 VIEWS)         PLAN:  Proceed with x-rays left knee, injection right knee gel injection #2 the series of 3    No orders of the defined types were placed in this encounter.        Procedure:      Patient prepped in sterile fashion with alcohol Betadine viscosupplementation was injected into the right inferolateral portal patient had a procedure well steroids and applied  Electronically signed by Adryan Gordillo PA-C on 4/27/2021 at 9:51 AM

## 2021-05-04 ENCOUNTER — OFFICE VISIT (OUTPATIENT)
Dept: ORTHOPEDIC SURGERY | Age: 61
End: 2021-05-04
Payer: COMMERCIAL

## 2021-05-04 VITALS
SYSTOLIC BLOOD PRESSURE: 135 MMHG | HEART RATE: 70 BPM | HEIGHT: 63 IN | WEIGHT: 175 LBS | BODY MASS INDEX: 31.01 KG/M2 | DIASTOLIC BLOOD PRESSURE: 71 MMHG

## 2021-05-04 DIAGNOSIS — M25.562 LEFT KNEE PAIN, UNSPECIFIED CHRONICITY: ICD-10-CM

## 2021-05-04 DIAGNOSIS — M25.561 CHRONIC PAIN OF RIGHT KNEE: Primary | ICD-10-CM

## 2021-05-04 DIAGNOSIS — G89.29 CHRONIC PAIN OF RIGHT KNEE: Primary | ICD-10-CM

## 2021-05-04 PROCEDURE — 99214 OFFICE O/P EST MOD 30 MIN: CPT | Performed by: ORTHOPAEDIC SURGERY

## 2021-05-04 PROCEDURE — 20610 DRAIN/INJ JOINT/BURSA W/O US: CPT | Performed by: PHYSICIAN ASSISTANT

## 2021-05-04 PROCEDURE — G8427 DOCREV CUR MEDS BY ELIG CLIN: HCPCS | Performed by: PHYSICIAN ASSISTANT

## 2021-05-04 PROCEDURE — G8417 CALC BMI ABV UP PARAM F/U: HCPCS | Performed by: PHYSICIAN ASSISTANT

## 2021-05-04 PROCEDURE — 3017F COLORECTAL CA SCREEN DOC REV: CPT | Performed by: PHYSICIAN ASSISTANT

## 2021-05-04 PROCEDURE — 99212 OFFICE O/P EST SF 10 MIN: CPT | Performed by: PHYSICIAN ASSISTANT

## 2021-05-04 PROCEDURE — 1036F TOBACCO NON-USER: CPT | Performed by: PHYSICIAN ASSISTANT

## 2021-05-04 RX ADMIN — Medication 30 MG: at 09:38

## 2021-05-04 NOTE — PROGRESS NOTES
1 tablet by mouth daily      Multiple Vitamin (MULTI VITAMIN DAILY PO) Take by mouth      loratadine (CLARITIN) 10 MG capsule Take 10 mg by mouth as needed      lipase-protease-amylase (CREON) 32911-95040 units delayed release capsule Take 1 capsule by mouth       Current Facility-Administered Medications   Medication Dose Route Frequency Provider Last Rate Last Admin    hyaluronan (ORTHOVISC) 30 MG/2ML injection 30 mg  30 mg Intra-articular Once Joselo Jansen PA-C           Allergies:  Fluticasone and Hydrocodone-acetaminophen    Social History:   Social History     Tobacco Use   Smoking Status Never Smoker   Smokeless Tobacco Never Used     Social History     Substance and Sexual Activity   Alcohol Use No    Frequency: Never    Binge frequency: Never     Social History     Substance and Sexual Activity   Drug Use No       Family History:  History reviewed. No pertinent family history. REVIEW OF SYSTEMS:  Constitutional: Denies any fever, chills. Musculoskeletal: Positive for bilateral knee pain. PHYSICAL EXAM:  [unfilled]  General appearance:  Alert and oriented x 3. No apparent distress, appears stated age, calm and cooperative. Musculoskeletal: Right knee was inspected skin is good repair there is no erythema increased warmth or cellulitis. Small joint effusion noted. Knee motion is full extension flexion around 95 degrees crepitus noted throughout range of motion    Left knee was inspected skin is good repair no erythema increased warmth cellulitis small joint effusion noted. Knee motion of the left is full extension flexion 105 degrees. Natali Zunigaed DATA:  CBC: No results found for: WBC, HGB, PLT  BMP:  No results found for: NA, K, CL, CO2, BUN, CREATININE, CALCIUM, GLUCOSE  PT/INR:  No results found for: PROTIME, INR  Troponin:  No results found for: TROPONINI  No results for input(s): LIPASE, AMYLASE in the last 72 hours.   No results for input(s): AST, ALT, BILIDIR, BILITOT, ALKPHOS in the

## 2021-05-18 ENCOUNTER — OFFICE VISIT (OUTPATIENT)
Dept: ORTHOPEDIC SURGERY | Age: 61
End: 2021-05-18
Payer: COMMERCIAL

## 2021-05-18 VITALS
BODY MASS INDEX: 31.01 KG/M2 | HEIGHT: 63 IN | WEIGHT: 175 LBS | SYSTOLIC BLOOD PRESSURE: 126 MMHG | DIASTOLIC BLOOD PRESSURE: 79 MMHG | HEART RATE: 75 BPM

## 2021-05-18 DIAGNOSIS — M17.12 PRIMARY OSTEOARTHRITIS OF LEFT KNEE: Primary | ICD-10-CM

## 2021-05-18 PROCEDURE — 1036F TOBACCO NON-USER: CPT | Performed by: NURSE PRACTITIONER

## 2021-05-18 PROCEDURE — 99213 OFFICE O/P EST LOW 20 MIN: CPT | Performed by: NURSE PRACTITIONER

## 2021-05-18 PROCEDURE — 99212 OFFICE O/P EST SF 10 MIN: CPT | Performed by: ORTHOPAEDIC SURGERY

## 2021-05-18 PROCEDURE — G8417 CALC BMI ABV UP PARAM F/U: HCPCS | Performed by: NURSE PRACTITIONER

## 2021-05-18 PROCEDURE — 3017F COLORECTAL CA SCREEN DOC REV: CPT | Performed by: NURSE PRACTITIONER

## 2021-05-18 PROCEDURE — 20610 DRAIN/INJ JOINT/BURSA W/O US: CPT | Performed by: NURSE PRACTITIONER

## 2021-05-18 PROCEDURE — G8427 DOCREV CUR MEDS BY ELIG CLIN: HCPCS | Performed by: NURSE PRACTITIONER

## 2021-05-18 RX ADMIN — Medication 30 MG: at 13:41

## 2021-05-18 NOTE — PROGRESS NOTES
Orthopaedic Progress Note      CHIEF COMPLAINT: Bilateral knee pain    HISTORY OF PRESENT ILLNESS:       Ms. Mercedez Gill  is a 63 y.o. female  who presents with left knee pain. She has recently just completed a series of Orthovisc injections into her right knee. Patient states that after her last injection on May 4 she had continued to have pain to the right knee. She had not had pain like this before in the past after receiving injections. Instructed patient to give this a few more weeks to see if that pain will alleviate. She is here today for a left knee Orthovisc injection which she has been approved by her insurance.       Past Medical History:    Past Medical History:   Diagnosis Date    [address] mellitus [address] [address]     GERD (gastroesophageal reflux disease)     Heart murmur     Hyperlipidemia     Hypertension     Mitral valve regurgitation     S/P arthroscopy of right knee        Past Surgical History:    Past Surgical History:   Procedure Laterality Date    BUNIONECTOMY Bilateral     CARPAL TUNNEL RELEASE Bilateral     CHOLECYSTECTOMY      COLONOSCOPY      FINGER TRIGGER RELEASE Left 2010    Left thumb    KNEE ARTHROSCOPY Right     w/ menisectomy    KNEE ARTHROSCOPY Right 01/24/2020         Current  Medications:  Current Outpatient Medications   Medication Sig Dispense Refill    aspirin 81 MG EC tablet Take 81 mg by mouth daily      Biotin 41952 MCG TBDP Take 1 tablet by mouth daily      carvedilol (COREG) 25 MG tablet Take 25 mg by mouth 2 times daily      famotidine (PEPCID) 40 MG tablet Take 40 mg by mouth 2 times daily      gemfibrozil (LOPID) 600 MG tablet Take 600 mg by mouth 2 times daily      metFORMIN (GLUCOPHAGE) 500 MG tablet Take 500 mg by mouth      lipase-protease-amylase (CREON) 13534-99287 units delayed release capsule Take 1 capsule by mouth      rosuvastatin (CRESTOR) 10 MG tablet Take 10 mg by mouth daily      Acidophilus Lactobacillus CAPS Take 1 tablet by mouth daily      Multiple Vitamin (MULTI VITAMIN DAILY PO) Take by mouth      loratadine (CLARITIN) 10 MG capsule Take 10 mg by mouth as needed       Current Facility-Administered Medications   Medication Dose Route Frequency Provider Last Rate Last Admin    hyaluronan (ORTHOVISC) 30 MG/2ML injection 30 mg  30 mg Intra-articular Once Joelle Jiménez, APRN - CNP           Allergies:  Fluticasone and Hydrocodone-acetaminophen    Social History:   Social History     Tobacco Use   Smoking Status Never Smoker   Smokeless Tobacco Never Used     Social History     Substance and Sexual Activity   Alcohol Use No     Social History     Substance and Sexual Activity   Drug Use No       Family History:  History reviewed. No pertinent family history. REVIEW OF SYSTEMS:  Constitutional: Denies any fever, chills. Musculoskeletal: Positive for bilateral knee pain. PHYSICAL EXAM:  [unfilled]  General appearance:  Alert and oriented x 3. No apparent distress, appears stated age, calm and cooperative. Musculoskeletal: Left lower extremity was examined. Skin is intact no rashes lesions or drainage. No redness warmth or cellulitis noted. Mild joint effusion noted. Knee motion is full extension flexion to 105 degrees. Good stability varus and valgus stress testing. Right knee was examined skin is intact no rashes lesions or drainage. No redness warmth or cellulitis noted. Minimal pain with range of motion of the right knee. Toe flexion and extension is intact. [facility] DATA:  CBC: No results found for: WBC, HGB, PLT  BMP:  No results found for: NA, K, CL, CO2, BUN, CREATININE, CALCIUM, GLUCOSE  PT/INR:  No results found for: PROTIME, INR  Troponin:  No results found for: TROPONINI  No results for input(s): LIPASE, AMYLASE in the last 72 hours. No results for input(s): AST, ALT, BILIDIR, BILITOT, ALKPHOS in the last 72 hours.   Uric Acid:  No components found for: URIC  Urine Culture:  No components found for: ZEKE    Radiology:   XR KNEE LEFT (3 VIEWS)    Result Date: 4/27/2021  EXAMINATION: THREE XRAY VIEWS OF THE LEFT KNEE 4/27/2021 9:40 am COMPARISON: None HISTORY: ORDERING SYSTEM PROVIDED HISTORY: Left knee pain, unspecified chronicity TECHNOLOGIST PROVIDED HISTORY: pain Reason for Exam: left knee pain Acuity: Acute Type of Exam: Initial FINDINGS: No evidence of acute fracture or dislocation. Alignment appears intact. There is narrowing of the medial and lateral joint compartments. Radiographically there is no significant joint effusion. Minimal degenerative spurring. Mild degenerative changes. Diagnosis Orders   1. Primary osteoarthritis of left knee  [place] ARTHROCENTESIS ASPIR&/INJ MAJOR JT/BURSA W/O US    hyaluronan (ORTHOVISC) 30 MG/2ML injection 30 mg         PLAN:  Plan at this time we will proceed with a left knee Orthovisc injection today. We will see patient back next week for her second injection. Activities as tolerated weightbearing as tolerated. Procedures    [place] ARTHROCENTESIS ASPIR&/INJ MAJOR JT/BURSA W/O US        Procedure: The left knee was prepped in sterile fashion with alcohol. A 22-gauge needle was introduced into the left inferolateral portal of the knee with a prefilled Orthovisc syringe. Medication injected. Hemostasis achieved dry sterile bandage applied. Patient tolerated procedure well.       Electronically signed by NATHANIEL Maldonado CNP on 5/18/2021 at 1:04 PM

## 2021-05-25 ENCOUNTER — OFFICE VISIT (OUTPATIENT)
Dept: ORTHOPEDIC SURGERY | Age: 61
End: 2021-05-25
Payer: COMMERCIAL

## 2021-05-25 VITALS
HEART RATE: 79 BPM | DIASTOLIC BLOOD PRESSURE: 70 MMHG | SYSTOLIC BLOOD PRESSURE: 130 MMHG | WEIGHT: 175 LBS | BODY MASS INDEX: 31.01 KG/M2 | HEIGHT: 63 IN | OXYGEN SATURATION: 98 %

## 2021-05-25 DIAGNOSIS — M17.12 PRIMARY OSTEOARTHRITIS OF LEFT KNEE: Primary | ICD-10-CM

## 2021-05-25 PROCEDURE — 20610 DRAIN/INJ JOINT/BURSA W/O US: CPT | Performed by: PHYSICIAN ASSISTANT

## 2021-05-25 PROCEDURE — 1036F TOBACCO NON-USER: CPT | Performed by: PHYSICIAN ASSISTANT

## 2021-05-25 PROCEDURE — 99212 OFFICE O/P EST SF 10 MIN: CPT | Performed by: PHYSICIAN ASSISTANT

## 2021-05-25 PROCEDURE — G8417 CALC BMI ABV UP PARAM F/U: HCPCS | Performed by: PHYSICIAN ASSISTANT

## 2021-05-25 PROCEDURE — 99214 OFFICE O/P EST MOD 30 MIN: CPT | Performed by: ORTHOPAEDIC SURGERY

## 2021-05-25 PROCEDURE — G8427 DOCREV CUR MEDS BY ELIG CLIN: HCPCS | Performed by: PHYSICIAN ASSISTANT

## 2021-05-25 PROCEDURE — 3017F COLORECTAL CA SCREEN DOC REV: CPT | Performed by: PHYSICIAN ASSISTANT

## 2021-05-25 RX ORDER — ACETAMINOPHEN 650 MG/1
TABLET, FILM COATED, EXTENDED RELEASE ORAL
COMMUNITY
Start: 2021-05-19

## 2021-05-25 RX ORDER — LOSARTAN POTASSIUM 100 MG/1
50 TABLET ORAL
COMMUNITY
Start: 2021-05-20

## 2021-05-25 RX ADMIN — Medication 30 MG: at 08:05

## 2021-05-25 NOTE — PROGRESS NOTES
Orthopaedic Progress Note      CHIEF COMPLAINT: Left knee pain    HISTORY OF PRESENT ILLNESS:       Ms. Nakia Pressley  is a 63 y.o. female  who presents with a history of primary osteoarthritis of her left knee. She is here today for second in series of 3 Orthovisc injections at left knee. She states the first 1 has helped very minimally at this point. Past Medical History:    Past Medical History:   Diagnosis Date    Diabetes mellitus ([address]     GERD (gastroesophageal reflux disease)     Heart murmur     Hyperlipidemia     Hypertension     Mitral valve regurgitation     S/P arthroscopy of right knee        Past Surgical History:    Past Surgical History:   Procedure Laterality Date    BUNIONECTOMY Bilateral     CARPAL TUNNEL RELEASE Bilateral     CHOLECYSTECTOMY      COLONOSCOPY      FINGER TRIGGER RELEASE Left 2010    Left thumb    KNEE ARTHROSCOPY Right     w/ menisectomy    KNEE ARTHROSCOPY Right 01/24/2020         Current  Medications:  Current Outpatient Medications   Medication Sig Dispense Refill    GOODSENSE ARTHRITIS PAIN 650 MG extended release tablet TAKE (1) TABLET BY MOUTH EVERY 8 HOURS AS NEEDED      losartan (COZAAR) 100 MG tablet TAKE 1 TABLET (100 MG TOTAL) BY MOUTH DAILY.       aspirin 81 MG EC tablet Take 81 mg by mouth daily      Biotin 17176 MCG TBDP Take 1 tablet by mouth daily      carvedilol (COREG) 25 MG tablet Take 25 mg by mouth 2 times daily      famotidine (PEPCID) 40 MG tablet Take 40 mg by mouth 2 times daily      gemfibrozil (LOPID) 600 MG tablet Take 600 mg by mouth 2 times daily      metFORMIN (GLUCOPHAGE) 500 MG tablet Take 500 mg by mouth      rosuvastatin (CRESTOR) 10 MG tablet Take 10 mg by mouth daily      Acidophilus Lactobacillus CAPS Take 1 tablet by mouth daily      Multiple Vitamin (MULTI VITAMIN DAILY PO) Take by mouth      loratadine (CLARITIN) 10 MG capsule Take 10 mg by mouth as needed      lipase-protease-amylase (CREON) 86173-37258 joint compartments. Radiographically there is no significant joint effusion. Minimal degenerative spurring. Mild degenerative changes. Diagnosis Orders   1. Primary osteoarthritis of left knee  [place] ARTHROCENTESIS ASPIR&/INJ MAJOR JT/BURSA W/O US    hyaluronan (ORTHOVISC) 30 MG/2ML injection 30 mg         PLAN:  Give her second in a series of 3 Orthovisc injection in the left knee    No orders of the defined types were placed in this encounter.        Procedure: Utilizing sterile technique the anterolateral portal of the left knee was washed with alcohol pad a 22-gauge was used to introduce standard prefilled Orthovisc syringe into the left knee joint is well-tolerated good hemostasis no dry sterile bandage applied      Electronically signed by Tree Almendarez PA-C on 5/25/2021 at 8:02 AM

## 2021-06-01 ENCOUNTER — OFFICE VISIT (OUTPATIENT)
Dept: ORTHOPEDIC SURGERY | Age: 61
End: 2021-06-01
Payer: COMMERCIAL

## 2021-06-01 VITALS
WEIGHT: 175 LBS | BODY MASS INDEX: 31.01 KG/M2 | SYSTOLIC BLOOD PRESSURE: 126 MMHG | DIASTOLIC BLOOD PRESSURE: 71 MMHG | HEIGHT: 63 IN | HEART RATE: 84 BPM

## 2021-06-01 DIAGNOSIS — M17.12 PRIMARY OSTEOARTHRITIS OF LEFT KNEE: Primary | ICD-10-CM

## 2021-06-01 DIAGNOSIS — M23.91 INTERNAL DERANGEMENT OF RIGHT KNEE: ICD-10-CM

## 2021-06-01 PROCEDURE — G8417 CALC BMI ABV UP PARAM F/U: HCPCS | Performed by: PHYSICIAN ASSISTANT

## 2021-06-01 PROCEDURE — 99212 OFFICE O/P EST SF 10 MIN: CPT | Performed by: PHYSICIAN ASSISTANT

## 2021-06-01 PROCEDURE — 99214 OFFICE O/P EST MOD 30 MIN: CPT | Performed by: PHYSICIAN ASSISTANT

## 2021-06-01 PROCEDURE — 3017F COLORECTAL CA SCREEN DOC REV: CPT | Performed by: PHYSICIAN ASSISTANT

## 2021-06-01 PROCEDURE — G8427 DOCREV CUR MEDS BY ELIG CLIN: HCPCS | Performed by: PHYSICIAN ASSISTANT

## 2021-06-01 PROCEDURE — 20610 DRAIN/INJ JOINT/BURSA W/O US: CPT | Performed by: PHYSICIAN ASSISTANT

## 2021-06-01 PROCEDURE — 1036F TOBACCO NON-USER: CPT | Performed by: PHYSICIAN ASSISTANT

## 2021-06-01 RX ADMIN — Medication 30 MG: at 11:01

## 2021-06-01 NOTE — PROGRESS NOTES
Orthopaedic Progress Note      CHIEF COMPLAINT: Bilateral knee pain    HISTORY OF PRESENT ILLNESS:       Ms. Natalya Motta  is a 63 y.o. female  who presents with history of bilateral knee pain. She recently completed a series of Orthovisc injection to her right knee. States that has not helped her pain whatsoever pain remains sharp localized the medial aspect of her right knee worse with any weightbearing worse with twisting maneuvers relieved with rest.  She feels as though the right knee wants to buckle on her. She states she has had 2 prior right knee operative arthroscopy for meniscal tears. She is here today for this problem as well as for her Orthovisc injection #3 into her left knee for primary left knee osteoarthritis. Past Medical History:    Past Medical History:   Diagnosis Date    Diabetes mellitus ([address]     GERD (gastroesophageal reflux disease)     Heart murmur     Hyperlipidemia     Hypertension     Mitral valve regurgitation     S/P arthroscopy of right knee        Past Surgical History:    Past Surgical History:   Procedure Laterality Date    BUNIONECTOMY Bilateral     CARPAL TUNNEL RELEASE Bilateral     CHOLECYSTECTOMY      COLONOSCOPY      FINGER TRIGGER RELEASE Left 2010    Left thumb    KNEE ARTHROSCOPY Right     w/ menisectomy    KNEE ARTHROSCOPY Right 01/24/2020         Current  Medications:  Current Outpatient Medications   Medication Sig Dispense Refill    [facility] ARTHRITIS PAIN 650 MG extended release tablet TAKE (1) TABLET BY MOUTH EVERY 8 HOURS AS NEEDED      losartan (COZAAR) 100 MG tablet TAKE 1 TABLET (100 MG TOTAL) BY MOUTH DAILY.       aspirin 81 MG EC tablet Take 81 mg by mouth daily      Biotin 06708 MCG TBDP Take 1 tablet by mouth daily      carvedilol (COREG) 25 MG tablet Take 25 mg by mouth 2 times daily      famotidine (PEPCID) 40 MG tablet Take 40 mg by mouth 2 times daily      gemfibrozil (LOPID) 600 MG tablet Take 600 mg by mouth 2 times daily      metFORMIN (GLUCOPHAGE) 500 MG tablet Take 500 mg by mouth      rosuvastatin (CRESTOR) 10 MG tablet Take 10 mg by mouth daily      Acidophilus Lactobacillus CAPS Take 1 tablet by mouth daily      Multiple Vitamin (MULTI VITAMIN DAILY PO) Take by mouth      loratadine (CLARITIN) 10 MG capsule Take 10 mg by mouth as needed      lipase-protease-amylase (CREON) 23312-35262 units delayed release capsule Take 1 capsule by mouth       No current facility-administered medications for this visit. Allergies:  Fluticasone and Hydrocodone-acetaminophen    Social History:   Social History     Tobacco Use   Smoking Status Never Smoker   Smokeless Tobacco Never Used     Social History     Substance and Sexual Activity   Alcohol Use No     Social History     Substance and Sexual Activity   Drug Use No       Family History:  History reviewed. No pertinent family history. REVIEW OF SYSTEMS:  Constitutional: Denies any fever, chills. Musculoskeletal: Positive for bilateral knee pain. PHYSICAL EXAM:  [unfilled]  General appearance:  Alert and oriented x 3. No apparent distress, appears stated age, calm and cooperative. Musculoskeletal: Right knee was first inspected skin is good repair no erythema increased warmth or cellulitis. Small joint effusion noted. She has significant medial joint line tenderness to palpation. Positive Samir's test.  Good stability varus and valgus anterior and posterior stresses. Knee motions full extension flexion is around 115 degrees she has no calf pain she neurovascular intact the foot. Left knee was inspected skin is good repair no erythema increased warmth or cellulitis. Small joint effusion noted. [employer]       DATA:  CBC: No results found for: WBC, HGB, PLT  BMP:  No results found for: NA, K, CL, CO2, BUN, CREATININE, CALCIUM, GLUCOSE  PT/INR:  No results found for: PROTIME, INR  Troponin:  No results found for: TROPONINI  No results for input(s): LIPASE, AMYLASE in

## 2021-06-04 ENCOUNTER — HOSPITAL ENCOUNTER (OUTPATIENT)
Dept: MRI IMAGING | Age: 61
Discharge: HOME | End: 2021-06-06
Payer: COMMERCIAL

## 2021-06-04 DIAGNOSIS — M17.11 PRIMARY OSTEOARTHRITIS OF RIGHT KNEE: ICD-10-CM

## 2021-06-04 DIAGNOSIS — M23.91 INTERNAL DERANGEMENT OF RIGHT KNEE: ICD-10-CM

## 2021-06-04 DIAGNOSIS — M17.12 PRIMARY OSTEOARTHRITIS OF LEFT KNEE: ICD-10-CM

## 2021-06-04 PROCEDURE — 73721 MRI JNT OF LWR EXTRE W/O DYE: CPT

## 2021-06-08 ENCOUNTER — TELEPHONE (OUTPATIENT)
Dept: ORTHOPEDIC SURGERY | Age: 61
End: 2021-06-08

## 2021-06-08 DIAGNOSIS — Z01.818 PRE-OP TESTING: Primary | ICD-10-CM

## 2021-06-14 ENCOUNTER — HOSPITAL ENCOUNTER (OUTPATIENT)
Dept: LAB | Age: 61
Discharge: HOME | End: 2021-06-14
Payer: COMMERCIAL

## 2021-06-14 ENCOUNTER — NURSE ONLY (OUTPATIENT)
Dept: ORTHOPEDIC SURGERY | Age: 61
End: 2021-06-14
Payer: COMMERCIAL

## 2021-06-14 ENCOUNTER — HOSPITAL ENCOUNTER (OUTPATIENT)
Dept: NON INVASIVE DIAGNOSTICS | Age: 61
Discharge: HOME | End: 2021-06-14
Payer: COMMERCIAL

## 2021-06-14 DIAGNOSIS — Z01.818 PRE-OP TESTING: ICD-10-CM

## 2021-06-14 DIAGNOSIS — S83.241S TEAR OF MEDIAL MENISCUS OF RIGHT KNEE, CURRENT, UNSPECIFIED TEAR TYPE, SEQUELA: ICD-10-CM

## 2021-06-14 LAB
ABSOLUTE EOS #: 0.27 K/UL (ref 0–0.44)
ABSOLUTE IMMATURE GRANULOCYTE: 0.04 K/UL (ref 0–0.3)
ABSOLUTE LYMPH #: 2.24 K/UL (ref 1.1–3.7)
ABSOLUTE MONO #: 0.88 K/UL (ref 0.1–1.2)
ANION GAP SERPL CALCULATED.3IONS-SCNC: 10 MMOL/L (ref 9–17)
BASOPHILS # BLD: 1 % (ref 0–2)
BASOPHILS ABSOLUTE: 0.06 K/UL (ref 0–0.2)
BUN BLDV-MCNC: 16 MG/DL (ref 6–20)
BUN/CREAT BLD: 18 (ref 9–20)
CALCIUM SERPL-MCNC: 10.1 MG/DL (ref 8.6–10.4)
CHLORIDE BLD-SCNC: 104 MMOL/L (ref 98–107)
CO2: 27 MMOL/L (ref 20–31)
CREAT SERPL-MCNC: 0.87 MG/DL (ref 0.5–0.9)
DIFFERENTIAL TYPE: ABNORMAL
EOSINOPHILS RELATIVE PERCENT: 3 % (ref 1–4)
GFR AFRICAN AMERICAN: >60 ML/MIN
GFR NON-AFRICAN AMERICAN: >60 ML/MIN
GFR SERPL CREATININE-BSD FRML MDRD: ABNORMAL ML/MIN/{1.73_M2}
GFR SERPL CREATININE-BSD FRML MDRD: ABNORMAL ML/MIN/{1.73_M2}
GLUCOSE BLD-MCNC: 180 MG/DL (ref 70–99)
HCT VFR BLD CALC: 40.6 % (ref 36.3–47.1)
HEMOGLOBIN: 13.3 G/DL (ref 11.9–15.1)
IMMATURE GRANULOCYTES: 1 %
LYMPHOCYTES # BLD: 26 % (ref 24–43)
MCH RBC QN AUTO: 31.3 PG (ref 25.2–33.5)
MCHC RBC AUTO-ENTMCNC: 32.8 G/DL (ref 25.2–33.5)
MCV RBC AUTO: 95.5 FL (ref 82.6–102.9)
MONOCYTES # BLD: 10 % (ref 3–12)
NRBC AUTOMATED: 0 PER 100 WBC
PDW BLD-RTO: 12.7 % (ref 11.8–14.4)
PLATELET # BLD: 230 K/UL (ref 138–453)
PLATELET ESTIMATE: ABNORMAL
PMV BLD AUTO: 11.4 FL (ref 8.1–13.5)
POTASSIUM SERPL-SCNC: 3.8 MMOL/L (ref 3.7–5.3)
RBC # BLD: 4.25 M/UL (ref 3.95–5.11)
RBC # BLD: ABNORMAL 10*6/UL
SEG NEUTROPHILS: 59 % (ref 36–65)
SEGMENTED NEUTROPHILS ABSOLUTE COUNT: 5.27 K/UL (ref 1.5–8.1)
SODIUM BLD-SCNC: 141 MMOL/L (ref 135–144)
WBC # BLD: 8.8 K/UL (ref 3.5–11.3)
WBC # BLD: ABNORMAL 10*3/UL

## 2021-06-14 PROCEDURE — 36415 COLL VENOUS BLD VENIPUNCTURE: CPT

## 2021-06-14 PROCEDURE — 99215 OFFICE O/P EST HI 40 MIN: CPT | Performed by: ORTHOPAEDIC SURGERY

## 2021-06-14 PROCEDURE — 85025 COMPLETE CBC W/AUTO DIFF WBC: CPT

## 2021-06-14 PROCEDURE — 80048 BASIC METABOLIC PNL TOTAL CA: CPT

## 2021-06-14 PROCEDURE — 93005 ELECTROCARDIOGRAM TRACING: CPT

## 2021-06-15 LAB
EKG ATRIAL RATE: 71 BPM
EKG P AXIS: 52 DEGREES
EKG P-R INTERVAL: 178 MS
EKG Q-T INTERVAL: 396 MS
EKG QRS DURATION: 80 MS
EKG QTC CALCULATION (BAZETT): 430 MS
EKG R AXIS: 15 DEGREES
EKG T AXIS: 81 DEGREES
EKG VENTRICULAR RATE: 71 BPM

## 2021-07-06 ENCOUNTER — OFFICE VISIT (OUTPATIENT)
Dept: ORTHOPEDIC SURGERY | Age: 61
End: 2021-07-06
Payer: COMMERCIAL

## 2021-07-06 ENCOUNTER — TELEPHONE (OUTPATIENT)
Dept: ORTHOPEDIC SURGERY | Age: 61
End: 2021-07-06

## 2021-07-06 VITALS
HEIGHT: 63 IN | BODY MASS INDEX: 31.01 KG/M2 | SYSTOLIC BLOOD PRESSURE: 108 MMHG | DIASTOLIC BLOOD PRESSURE: 62 MMHG | WEIGHT: 175 LBS

## 2021-07-06 DIAGNOSIS — S83.241S TEAR OF MEDIAL MENISCUS OF RIGHT KNEE, CURRENT, UNSPECIFIED TEAR TYPE, SEQUELA: Primary | ICD-10-CM

## 2021-07-06 PROCEDURE — 1036F TOBACCO NON-USER: CPT | Performed by: NURSE PRACTITIONER

## 2021-07-06 PROCEDURE — 99215 OFFICE O/P EST HI 40 MIN: CPT | Performed by: NURSE PRACTITIONER

## 2021-07-06 PROCEDURE — 99213 OFFICE O/P EST LOW 20 MIN: CPT | Performed by: NURSE PRACTITIONER

## 2021-07-06 PROCEDURE — G8427 DOCREV CUR MEDS BY ELIG CLIN: HCPCS | Performed by: NURSE PRACTITIONER

## 2021-07-06 PROCEDURE — G8417 CALC BMI ABV UP PARAM F/U: HCPCS | Performed by: NURSE PRACTITIONER

## 2021-07-06 PROCEDURE — 3017F COLORECTAL CA SCREEN DOC REV: CPT | Performed by: NURSE PRACTITIONER

## 2021-07-06 NOTE — H&P
History and Physical        CHIEF COMPLAINT: Right knee pain    HISTORY OF PRESENT ILLNESS:      The patient is a 63 y.o. female  who presents with right knee pain. Patient has had a history of bilateral knee pain. She has back in May finished in Orthovisc series of injections to her right knee. She did not notice that this improved her pain but actually noticed it worsening. Pain was worse with any weightbearing or twisting maneuvers. Pain was relieved with rest.  She feels as if her right knee wants to buckle on her. She has had 2 previous right knee scopes for meniscal tears in the past.  Her last knee scope surgery was in January 2020. She denies any recent injury that she can recall. She did undergo an MRI of her right knee on June 4, 2021. She was noted to have a oblique undersurface tear in the posterior horn and body of the medial meniscus. 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 Dr. Bandar Rahman did review MRI and felt she would be best treated with a right knee scope for her new medial meniscus tear. Past Medical History:    Past Medical History:   Diagnosis Date    Diabetes mellitus ([address]     GERD (gastroesophageal reflux disease)     Heart murmur     Hyperlipidemia     Hypertension     Mitral valve regurgitation     S/P arthroscopy of right knee        Past Surgical History:    Past Surgical History:   Procedure Laterality Date    BUNIONECTOMY Bilateral     CARPAL TUNNEL RELEASE Bilateral     CHOLECYSTECTOMY      COLONOSCOPY      FINGER TRIGGER RELEASE Left 2010    Left thumb    KNEE ARTHROSCOPY Right     w/ menisectomy    KNEE ARTHROSCOPY Right 01/24/2020       Medications Prior to Admission:   Prior to Admission medications    Medication Sig Start Date End Date Taking?  Authorizing Provider   [facility] ARTHRITIS PAIN 650 MG extended release tablet TAKE (1) TABLET BY MOUTH EVERY 8 HOURS AS NEEDED 5/19/21  Yes Historical Provider, MD   losartan (COZAAR) 100 MG tablet TAKE 1 TABLET (100 MG TOTAL) BY MOUTH DAILY. 5/20/21  Yes Historical Provider, MD   aspirin 81 MG EC tablet Take 81 mg by mouth daily   Yes Historical Provider, MD   Biotin 44563 MCG TBDP Take 1 tablet by mouth daily   Yes Historical Provider, MD   carvedilol (COREG) 25 MG tablet Take 25 mg by mouth 2 times daily   Yes Historical Provider, MD   famotidine (PEPCID) 40 MG tablet Take 40 mg by mouth 2 times daily 3/11/21 3/11/22 Yes Historical Provider, MD   gemfibrozil (LOPID) 600 MG tablet Take 600 mg by mouth 2 times daily 8/31/20  Yes Historical Provider, MD   metFORMIN (GLUCOPHAGE) 850 MG tablet Take 850 mg by mouth 2 times daily  3/8/21 9/4/21 Yes Historical Provider, MD   lipase-protease-amylase (CREON) 57934-49556 units delayed release capsule Take 1 capsule by mouth 2/19/21 7/6/21 Yes Historical Provider, MD   rosuvastatin (CRESTOR) 10 MG tablet Take 10 mg by mouth daily   Yes Historical Provider, MD   Acidophilus Lactobacillus CAPS Take 1 tablet by mouth daily   Yes Historical Provider, MD   Multiple Vitamin (MULTI VITAMIN DAILY PO) Take by mouth   Yes Historical Provider, MD   loratadine (CLARITIN) 10 MG capsule Take 10 mg by mouth as needed   Yes Historical Provider, MD    Scheduled Meds:  Continuous Infusions:  PRN Meds:.     Allergies:  Fluticasone and Hydrocodone-acetaminophen    Social History:   Social History     Socioeconomic History    Marital status:      Spouse name: None    Number of children: None    Years of education: None    Highest education level: None   Occupational History    None   Tobacco Use    Smoking status: Never Smoker    Smokeless tobacco: Never Used   Vaping Use    Vaping Use: Never used   Substance and Sexual Activity    Alcohol use: No    Drug use: No    Sexual activity: None   Other Topics Concern    None   Social History Narrative    None     Social Determinants of Health     Financial Resource Strain:     Difficulty of Paying Living Expenses:    Food Insecurity:     Worried About Running Out of Food in the Last Year:     [address] in the Last Year:    Transportation Needs:     Lack of Transportation (Medical):  Lack of Transportation (Non-Medical):    Physical Activity:     Days of Exercise per Week:     Minutes of Exercise per Session:    Stress:     Feeling of Stress :    Social Connections:     Frequency of Communication with Friends and Family:     Frequency of Social Gatherings with Friends and Family:     Attends Islam Services:     Active Member of Clubs or Organizations:     Attends Club or Organization Meetings:     Marital Status:    Intimate Partner Violence:     Fear of Current or Ex-Partner:     Emotionally Abused:     Physically Abused:     Sexually Abused:      Social History     Tobacco Use   Smoking Status Never Smoker   Smokeless Tobacco Never Used     Social History     Substance and Sexual Activity   Alcohol Use No     Social History     Substance and Sexual Activity   Drug Use No       Family History:  History reviewed. No pertinent family history. REVIEW OF SYSTEMS:  Constitutional: Denies any fever, chills. Derm: Denies any rash or skin color change. Eyes: Denies blurred or decreased in vision. Ent: Denies any tinnitus or vertigo. Resp: Denies any cough or shortness of breath. CV: Denies any syncope, palpitations or chest pain. GI:  Denies any abdominal pain, nausea, vomiting, constipation or diarrhea. : Denies any hematuria, hesitancy, or dysuria. Heme/Lymph: Denies any bleeding. Musculoskeletal: Positive for right knee pain. Neuro: Denies any dizziness, paresthesia or weakness. PHYSICAL EXAM:  [unfilled]  General appearance:  Alert and oriented x 3. No apparent distress, appears stated age and cooperative. HEENT:  Normal cephalic, atraumatic without obvious deformity. Pupils equal, round, and reactive to light. Conjunctivae/corneas clear.   Neck: Supple, with full range of motion. Trachea midline. Respiratory:  Normal respiratory effort. No audible Wheezes or Rhonchi. Cardiovascular:  Regular rate and rhythm. Abdomen: Soft, non-tender, non-distended. Musculoskeletal:   Right lower extremity :  Denies calf pain to palpation. Pain with range of motion without deformity. Pt can flex and extend right toes. Skin is intact no rashes lesions or drainage. No redness warmth or cellulitis noted. Small joint effusion is noted. Significant medial joint line pain to palpation. Positive Samir's test.  Good stability to varus and valgus stress testing anterior and posterior stresses. Full extension is noted flexion around 115 degrees. .   Skin: Skin color, texture, turgor normal.  No rashes or lesions. Neurologic:  Neurovascularly intact without any focal sensory/motor deficits. Sensation intact. DATA:  CBC:   Lab Results   Component Value Date    WBC 8.8 06/14/2021    HGB 13.3 06/14/2021     06/14/2021     BMP:    Lab Results   Component Value Date     06/14/2021    K 3.8 06/14/2021     06/14/2021    CO2 27 06/14/2021    BUN 16 06/14/2021    CREATININE 0.87 06/14/2021    CALCIUM 10.1 06/14/2021    GLUCOSE 180 06/14/2021     PT/INR:  No results found for: PROTIME, INR  Troponin:  No results found for: TROPONINI  No results for input(s): LIPASE, AMYLASE in the last 72 hours. No results for input(s): AST, ALT, BILIDIR, BILITOT, ALKPHOS in the last 72 hours. Radiology:  No results found. ASSESSMENT:Active Problems:    * No active hospital problems. *  Resolved Problems:    * No resolved hospital problems. *  Right medial meniscus tear    PLAN as discussed with Dr. Marlene Darnell:  1. Plan at this time Dr. Marlene Darnell thinks patient would be best treated with her right knee scope. We are planning to do this on July 20. Surgical risks and benefits were discussed with patient. Patient elected to proceed.     The patient was counseled at length about the risks of bernard Covid-19 during their perioperative period and any recovery window from their procedure. The patient was made aware that bernard Covid-19  may worsen their prognosis for recovering from their procedure  and lend to a higher morbidity and/or mortality risk. All material risks, benefits, and reasonable alternatives including postponing the procedure were discussed. The patient does wish to proceed with the procedure at this time.          Electronically signed by Reyes Carmin, APRN - CNP on 7/6/2021 at 11:59 AM

## 2021-07-06 NOTE — TELEPHONE ENCOUNTER
Does this pt see a cardiologist?   I see that she had an echo but I don't see any other EKG to compare the one we just had done. Face Mask

## 2021-07-06 NOTE — TELEPHONE ENCOUNTER
tablet Take 40 mg by mouth 2 times daily      gemfibrozil (LOPID) 600 MG tablet Take 600 mg by mouth 2 times daily      metFORMIN (GLUCOPHAGE) 850 MG tablet Take 850 mg by mouth 2 times daily       lipase-protease-amylase (CREON) 04760-18151 units delayed release capsule Take 1 capsule by mouth      rosuvastatin (CRESTOR) 10 MG tablet Take 10 mg by mouth daily      Acidophilus Lactobacillus CAPS Take 1 tablet by mouth daily      Multiple Vitamin (MULTI VITAMIN DAILY PO) Take by mouth      loratadine (CLARITIN) 10 MG capsule Take 10 mg by mouth as needed       No current facility-administered medications for this visit. Scheduled Meds:  Continuous Infusions:  PRN Meds:. Prior to Admission medications    Medication Sig Start Date End Date Taking? Authorizing Provider   [facility] ARTHRITIS PAIN 650 MG extended release tablet TAKE (1) TABLET BY MOUTH EVERY 8 HOURS AS NEEDED 5/19/21   Historical Provider, MD   losartan (COZAAR) 100 MG tablet TAKE 1 TABLET (100 MG TOTAL) BY MOUTH DAILY.  5/20/21   Historical Provider, MD   aspirin 81 MG EC tablet Take 81 mg by mouth daily    Historical Provider, MD   Biotin 54425 MCG TBDP Take 1 tablet by mouth daily    Historical Provider, MD   carvedilol (COREG) 25 MG tablet Take 25 mg by mouth 2 times daily    Historical Provider, MD   famotidine (PEPCID) 40 MG tablet Take 40 mg by mouth 2 times daily 3/11/21 3/11/22  Historical Provider, MD   gemfibrozil (LOPID) 600 MG tablet Take 600 mg by mouth 2 times daily 8/31/20   Historical Provider, MD   metFORMIN (GLUCOPHAGE) 850 MG tablet Take 850 mg by mouth 2 times daily  3/8/21 9/4/21  Historical Provider, MD   lipase-protease-amylase (CREON) 94836-39090 units delayed release capsule Take 1 capsule by mouth 2/19/21 7/6/21  Historical Provider, MD   rosuvastatin (CRESTOR) 10 MG tablet Take 10 mg by mouth daily    Historical Provider, MD   Acidophilus Lactobacillus CAPS Take 1 tablet by mouth daily    Historical Provider, MD Multiple Vitamin (MULTI VITAMIN DAILY PO) Take by mouth    Historical Provider, MD   loratadine (CLARITIN) 10 MG capsule Take 10 mg by mouth as needed    Historical Provider, MD     Vital Signs (Current) [unfilled]    Weight:   Wt Readings from Last 1 Encounters:   07/06/21 175 lb (79.4 kg)     Height:   Ht Readings from Last 1 Encounters:   07/06/21 5' 3\" (1.6 m)      BMI:  There is no height or weight on file to calculate BMI. Estimated body mass index is 31 kg/m² as calculated from the following:    Height as of an earlier encounter on 7/6/21: 5' 3\" (1.6 m). Weight as of an earlier encounter on 7/6/21: 175 lb (79.4 kg). body mass index is unknown because there is no height or weight on file. Preoperative Testing: These are the current and completed labs:  CBC:   Lab Results   Component Value Date    WBC 8.8 06/14/2021    RBC 4.25 06/14/2021    HGB 13.3 06/14/2021    HCT 40.6 06/14/2021    MCV 95.5 06/14/2021    RDW 12.7 06/14/2021     06/14/2021     CMP:   Lab Results   Component Value Date     06/14/2021    K 3.8 06/14/2021     06/14/2021    CO2 27 06/14/2021    BUN 16 06/14/2021    CREATININE 0.87 06/14/2021    GFRAA >60 06/14/2021    LABGLOM >60 06/14/2021    GLUCOSE 180 06/14/2021    CALCIUM 10.1 06/14/2021     POC Tests: No results for input(s): POCGLU, POCNA, POCK, POCCL, POCBUN, POCHEMO, POCHCT in the last 72 hours.   Coags  No results found for: PROTIME, INR, APTT  HCG (If Applicable) No results found for: PREGTESTUR, PREGSERUM, HCG, HCGQUANT   ABGs No results found for: PHART, PO2ART, HOH8VZJ, LRT5DOK, BEART, F7MTFFVN   Type & Screen (If Applicable)  No results found for: Justin Colace    Additional ordered pre-operative testing:  [x]CBC    []ABG      [x] BMP   []URINALYSIS   []CMP    []HCG   []COAGS PT/INR  []T&C  []LFTs   []TYPE AND SCREEN    [x] EKG  [] Chest X-Ray  [] Other Radiology      [x] Sent to Anesthesia for your review: 07/06/21   [] Additional Orders: None     Comments:None   Requests: None    Signed: Skylar Oseguera LPN 9/2/3312 40:74 AM

## 2021-07-13 ENCOUNTER — TELEPHONE (OUTPATIENT)
Dept: ORTHOPEDIC SURGERY | Age: 61
End: 2021-07-13

## 2021-07-13 NOTE — TELEPHONE ENCOUNTER
The prior auth department called and stated Anita's right knee scope was not approved. We have to wait out 60 days from when it was originally submitted on 6-16-21, so we rescheduled her to 8-17-21. Patient aware and in agreement.

## 2021-08-17 ENCOUNTER — ANESTHESIA (OUTPATIENT)
Dept: OPERATING ROOM | Age: 61
End: 2021-08-17
Payer: COMMERCIAL

## 2021-08-17 ENCOUNTER — HOSPITAL ENCOUNTER (OUTPATIENT)
Age: 61
Setting detail: OUTPATIENT SURGERY
Discharge: HOME | End: 2021-08-17
Attending: ORTHOPAEDIC SURGERY | Admitting: ORTHOPAEDIC SURGERY
Payer: COMMERCIAL

## 2021-08-17 ENCOUNTER — ANESTHESIA EVENT (OUTPATIENT)
Dept: OPERATING ROOM | Age: 61
End: 2021-08-17
Payer: COMMERCIAL

## 2021-08-17 VITALS
SYSTOLIC BLOOD PRESSURE: 125 MMHG | WEIGHT: 166.4 LBS | OXYGEN SATURATION: 97 % | RESPIRATION RATE: 16 BRPM | DIASTOLIC BLOOD PRESSURE: 68 MMHG | HEART RATE: 69 BPM | TEMPERATURE: 97 F | HEIGHT: 63 IN | BODY MASS INDEX: 29.48 KG/M2

## 2021-08-17 VITALS
DIASTOLIC BLOOD PRESSURE: 46 MMHG | RESPIRATION RATE: 9 BRPM | OXYGEN SATURATION: 100 % | SYSTOLIC BLOOD PRESSURE: 74 MMHG

## 2021-08-17 DIAGNOSIS — S83.241A TEAR OF MEDIAL MENISCUS OF RIGHT KNEE, CURRENT, UNSPECIFIED TEAR TYPE, INITIAL ENCOUNTER: Primary | ICD-10-CM

## 2021-08-17 LAB — GLUCOSE BLD-MCNC: 153 MG/DL (ref 65–105)

## 2021-08-17 PROCEDURE — 3600000004 HC SURGERY LEVEL 4 BASE: Performed by: ORTHOPAEDIC SURGERY

## 2021-08-17 PROCEDURE — 6360000002 HC RX W HCPCS: Performed by: ORTHOPAEDIC SURGERY

## 2021-08-17 PROCEDURE — 7100000011 HC PHASE II RECOVERY - ADDTL 15 MIN: Performed by: ORTHOPAEDIC SURGERY

## 2021-08-17 PROCEDURE — 2709999900 HC NON-CHARGEABLE SUPPLY: Performed by: ORTHOPAEDIC SURGERY

## 2021-08-17 PROCEDURE — 7100000000 HC PACU RECOVERY - FIRST 15 MIN: Performed by: ORTHOPAEDIC SURGERY

## 2021-08-17 PROCEDURE — 2580000003 HC RX 258: Performed by: ORTHOPAEDIC SURGERY

## 2021-08-17 PROCEDURE — 3600000014 HC SURGERY LEVEL 4 ADDTL 15MIN: Performed by: ORTHOPAEDIC SURGERY

## 2021-08-17 PROCEDURE — 7100000010 HC PHASE II RECOVERY - FIRST 15 MIN: Performed by: ORTHOPAEDIC SURGERY

## 2021-08-17 PROCEDURE — 3700000001 HC ADD 15 MINUTES (ANESTHESIA): Performed by: ORTHOPAEDIC SURGERY

## 2021-08-17 PROCEDURE — 2500000003 HC RX 250 WO HCPCS: Performed by: NURSE ANESTHETIST, CERTIFIED REGISTERED

## 2021-08-17 PROCEDURE — 7100000001 HC PACU RECOVERY - ADDTL 15 MIN: Performed by: ORTHOPAEDIC SURGERY

## 2021-08-17 PROCEDURE — 6360000002 HC RX W HCPCS: Performed by: NURSE ANESTHETIST, CERTIFIED REGISTERED

## 2021-08-17 PROCEDURE — 6370000000 HC RX 637 (ALT 250 FOR IP): Performed by: ORTHOPAEDIC SURGERY

## 2021-08-17 PROCEDURE — 3700000000 HC ANESTHESIA ATTENDED CARE: Performed by: ORTHOPAEDIC SURGERY

## 2021-08-17 PROCEDURE — 29875 ARTHRS KNEE SURG SYNVCT LMTD: CPT | Performed by: ORTHOPAEDIC SURGERY

## 2021-08-17 PROCEDURE — 82947 ASSAY GLUCOSE BLOOD QUANT: CPT

## 2021-08-17 PROCEDURE — 2500000003 HC RX 250 WO HCPCS: Performed by: ORTHOPAEDIC SURGERY

## 2021-08-17 RX ORDER — FENTANYL CITRATE 50 UG/ML
INJECTION, SOLUTION INTRAMUSCULAR; INTRAVENOUS PRN
Status: DISCONTINUED | OUTPATIENT
Start: 2021-08-17 | End: 2021-08-17 | Stop reason: SDUPTHER

## 2021-08-17 RX ORDER — SODIUM CHLORIDE 0.9 % (FLUSH) 0.9 %
5-40 SYRINGE (ML) INJECTION PRN
Status: DISCONTINUED | OUTPATIENT
Start: 2021-08-17 | End: 2021-08-17 | Stop reason: HOSPADM

## 2021-08-17 RX ORDER — SODIUM CHLORIDE 0.9 % (FLUSH) 0.9 %
5-40 SYRINGE (ML) INJECTION EVERY 12 HOURS SCHEDULED
Status: DISCONTINUED | OUTPATIENT
Start: 2021-08-17 | End: 2021-08-17 | Stop reason: HOSPADM

## 2021-08-17 RX ORDER — OXYCODONE HYDROCHLORIDE 5 MG/1
5 TABLET ORAL PRN
Status: DISCONTINUED | OUTPATIENT
Start: 2021-08-17 | End: 2021-08-17 | Stop reason: HOSPADM

## 2021-08-17 RX ORDER — MIDAZOLAM HYDROCHLORIDE 1 MG/ML
INJECTION INTRAMUSCULAR; INTRAVENOUS PRN
Status: DISCONTINUED | OUTPATIENT
Start: 2021-08-17 | End: 2021-08-17 | Stop reason: SDUPTHER

## 2021-08-17 RX ORDER — PROPOFOL 10 MG/ML
INJECTION, EMULSION INTRAVENOUS PRN
Status: DISCONTINUED | OUTPATIENT
Start: 2021-08-17 | End: 2021-08-17 | Stop reason: SDUPTHER

## 2021-08-17 RX ORDER — BUPIVACAINE HYDROCHLORIDE AND EPINEPHRINE 5; 5 MG/ML; UG/ML
INJECTION, SOLUTION EPIDURAL; INTRACAUDAL; PERINEURAL PRN
Status: DISCONTINUED | OUTPATIENT
Start: 2021-08-17 | End: 2021-08-17 | Stop reason: ALTCHOICE

## 2021-08-17 RX ORDER — KETOROLAC TROMETHAMINE 30 MG/ML
INJECTION, SOLUTION INTRAMUSCULAR; INTRAVENOUS PRN
Status: DISCONTINUED | OUTPATIENT
Start: 2021-08-17 | End: 2021-08-17 | Stop reason: SDUPTHER

## 2021-08-17 RX ORDER — FENTANYL CITRATE 50 UG/ML
25 INJECTION, SOLUTION INTRAMUSCULAR; INTRAVENOUS EVERY 5 MIN PRN
Status: DISCONTINUED | OUTPATIENT
Start: 2021-08-17 | End: 2021-08-17 | Stop reason: HOSPADM

## 2021-08-17 RX ORDER — TRIAMCINOLONE ACETONIDE 40 MG/ML
INJECTION, SUSPENSION INTRA-ARTICULAR; INTRAMUSCULAR PRN
Status: DISCONTINUED | OUTPATIENT
Start: 2021-08-17 | End: 2021-08-17 | Stop reason: ALTCHOICE

## 2021-08-17 RX ORDER — SODIUM CHLORIDE 9 MG/ML
25 INJECTION, SOLUTION INTRAVENOUS PRN
Status: DISCONTINUED | OUTPATIENT
Start: 2021-08-17 | End: 2021-08-17 | Stop reason: HOSPADM

## 2021-08-17 RX ORDER — DEXAMETHASONE SODIUM PHOSPHATE 4 MG/ML
INJECTION, SOLUTION INTRA-ARTICULAR; INTRALESIONAL; INTRAMUSCULAR; INTRAVENOUS; SOFT TISSUE PRN
Status: DISCONTINUED | OUTPATIENT
Start: 2021-08-17 | End: 2021-08-17 | Stop reason: SDUPTHER

## 2021-08-17 RX ORDER — ONDANSETRON 2 MG/ML
INJECTION INTRAMUSCULAR; INTRAVENOUS PRN
Status: DISCONTINUED | OUTPATIENT
Start: 2021-08-17 | End: 2021-08-17 | Stop reason: SDUPTHER

## 2021-08-17 RX ORDER — SCOLOPAMINE TRANSDERMAL SYSTEM 1 MG/1
1 PATCH, EXTENDED RELEASE TRANSDERMAL ONCE
Status: DISCONTINUED | OUTPATIENT
Start: 2021-08-17 | End: 2021-08-17 | Stop reason: HOSPADM

## 2021-08-17 RX ORDER — OXYCODONE HYDROCHLORIDE 5 MG/1
10 TABLET ORAL PRN
Status: DISCONTINUED | OUTPATIENT
Start: 2021-08-17 | End: 2021-08-17 | Stop reason: HOSPADM

## 2021-08-17 RX ORDER — TRAMADOL HYDROCHLORIDE 50 MG/1
50 TABLET ORAL EVERY 4 HOURS PRN
Qty: 40 TABLET | Refills: 0 | Status: SHIPPED | OUTPATIENT
Start: 2021-08-17 | End: 2021-08-24

## 2021-08-17 RX ORDER — FENTANYL CITRATE 50 UG/ML
50 INJECTION, SOLUTION INTRAMUSCULAR; INTRAVENOUS EVERY 5 MIN PRN
Status: DISCONTINUED | OUTPATIENT
Start: 2021-08-17 | End: 2021-08-17 | Stop reason: HOSPADM

## 2021-08-17 RX ORDER — ONDANSETRON 2 MG/ML
4 INJECTION INTRAMUSCULAR; INTRAVENOUS PRN
Status: DISCONTINUED | OUTPATIENT
Start: 2021-08-17 | End: 2021-08-17 | Stop reason: HOSPADM

## 2021-08-17 RX ORDER — ROCURONIUM BROMIDE 10 MG/ML
INJECTION, SOLUTION INTRAVENOUS PRN
Status: DISCONTINUED | OUTPATIENT
Start: 2021-08-17 | End: 2021-08-17 | Stop reason: SDUPTHER

## 2021-08-17 RX ORDER — DIPHENHYDRAMINE HYDROCHLORIDE 50 MG/ML
12.5 INJECTION INTRAMUSCULAR; INTRAVENOUS
Status: DISCONTINUED | OUTPATIENT
Start: 2021-08-17 | End: 2021-08-17 | Stop reason: HOSPADM

## 2021-08-17 RX ORDER — SODIUM CHLORIDE, SODIUM LACTATE, POTASSIUM CHLORIDE, CALCIUM CHLORIDE 600; 310; 30; 20 MG/100ML; MG/100ML; MG/100ML; MG/100ML
INJECTION, SOLUTION INTRAVENOUS CONTINUOUS
Status: DISCONTINUED | OUTPATIENT
Start: 2021-08-17 | End: 2021-08-17 | Stop reason: HOSPADM

## 2021-08-17 RX ORDER — LIDOCAINE HYDROCHLORIDE 20 MG/ML
INJECTION, SOLUTION EPIDURAL; INFILTRATION; INTRACAUDAL; PERINEURAL PRN
Status: DISCONTINUED | OUTPATIENT
Start: 2021-08-17 | End: 2021-08-17 | Stop reason: SDUPTHER

## 2021-08-17 RX ORDER — MORPHINE SULFATE 2 MG/ML
2 INJECTION, SOLUTION INTRAMUSCULAR; INTRAVENOUS EVERY 5 MIN PRN
Status: DISCONTINUED | OUTPATIENT
Start: 2021-08-17 | End: 2021-08-17 | Stop reason: HOSPADM

## 2021-08-17 RX ORDER — DIPHENHYDRAMINE HYDROCHLORIDE 50 MG/ML
INJECTION INTRAMUSCULAR; INTRAVENOUS PRN
Status: DISCONTINUED | OUTPATIENT
Start: 2021-08-17 | End: 2021-08-17 | Stop reason: SDUPTHER

## 2021-08-17 RX ADMIN — SUGAMMADEX 200 MG: 100 INJECTION, SOLUTION INTRAVENOUS at 08:55

## 2021-08-17 RX ADMIN — ONDANSETRON 4 MG: 2 INJECTION INTRAMUSCULAR; INTRAVENOUS at 08:44

## 2021-08-17 RX ADMIN — SODIUM CHLORIDE, POTASSIUM CHLORIDE, SODIUM LACTATE AND CALCIUM CHLORIDE: 600; 310; 30; 20 INJECTION, SOLUTION INTRAVENOUS at 08:05

## 2021-08-17 RX ADMIN — MIDAZOLAM HYDROCHLORIDE 1 MG: 1 INJECTION, SOLUTION INTRAMUSCULAR; INTRAVENOUS at 08:18

## 2021-08-17 RX ADMIN — PHENYLEPHRINE HYDROCHLORIDE 100 MCG: 10 INJECTION INTRAVENOUS at 08:39

## 2021-08-17 RX ADMIN — MIDAZOLAM HYDROCHLORIDE 1 MG: 1 INJECTION, SOLUTION INTRAMUSCULAR; INTRAVENOUS at 08:20

## 2021-08-17 RX ADMIN — DIPHENHYDRAMINE HYDROCHLORIDE 15 MG: 50 INJECTION, SOLUTION INTRAMUSCULAR; INTRAVENOUS at 08:44

## 2021-08-17 RX ADMIN — ROCURONIUM BROMIDE 30 MG: 10 INJECTION, SOLUTION INTRAVENOUS at 08:26

## 2021-08-17 RX ADMIN — FENTANYL CITRATE 50 MCG: 50 INJECTION, SOLUTION INTRAMUSCULAR; INTRAVENOUS at 08:18

## 2021-08-17 RX ADMIN — KETOROLAC TROMETHAMINE 30 MG: 30 INJECTION, SOLUTION INTRAMUSCULAR; INTRAVENOUS at 08:44

## 2021-08-17 RX ADMIN — PHENYLEPHRINE HYDROCHLORIDE 100 MCG: 10 INJECTION INTRAVENOUS at 08:37

## 2021-08-17 RX ADMIN — FENTANYL CITRATE 50 MCG: 50 INJECTION, SOLUTION INTRAMUSCULAR; INTRAVENOUS at 08:20

## 2021-08-17 RX ADMIN — LIDOCAINE HYDROCHLORIDE 100 MG: 20 INJECTION, SOLUTION EPIDURAL; INFILTRATION; INTRACAUDAL; PERINEURAL at 08:26

## 2021-08-17 RX ADMIN — PROPOFOL 150 MG: 10 INJECTION, EMULSION INTRAVENOUS at 08:26

## 2021-08-17 RX ADMIN — DEXAMETHASONE SODIUM PHOSPHATE 4 MG: 4 INJECTION, SOLUTION INTRAMUSCULAR; INTRAVENOUS at 08:44

## 2021-08-17 RX ADMIN — PHENYLEPHRINE HYDROCHLORIDE 100 MCG: 10 INJECTION INTRAVENOUS at 08:35

## 2021-08-17 RX ADMIN — PHENYLEPHRINE HYDROCHLORIDE 100 MCG: 10 INJECTION INTRAVENOUS at 08:32

## 2021-08-17 ASSESSMENT — PAIN DESCRIPTION - ORIENTATION
ORIENTATION: RIGHT

## 2021-08-17 ASSESSMENT — PAIN DESCRIPTION - LOCATION
LOCATION: KNEE

## 2021-08-17 ASSESSMENT — PULMONARY FUNCTION TESTS
PIF_VALUE: 23
PIF_VALUE: 18
PIF_VALUE: 25
PIF_VALUE: 24
PIF_VALUE: 23
PIF_VALUE: 25
PIF_VALUE: 50
PIF_VALUE: 0
PIF_VALUE: 24
PIF_VALUE: 18
PIF_VALUE: 24
PIF_VALUE: 21
PIF_VALUE: 23
PIF_VALUE: 12
PIF_VALUE: 2
PIF_VALUE: 25
PIF_VALUE: 24
PIF_VALUE: 27
PIF_VALUE: 23
PIF_VALUE: 23
PIF_VALUE: 25
PIF_VALUE: 23
PIF_VALUE: 35
PIF_VALUE: 0
PIF_VALUE: 0
PIF_VALUE: 24
PIF_VALUE: 24
PIF_VALUE: 35
PIF_VALUE: 21
PIF_VALUE: 2
PIF_VALUE: 24
PIF_VALUE: 26
PIF_VALUE: 56
PIF_VALUE: 27
PIF_VALUE: 35
PIF_VALUE: 23
PIF_VALUE: 2
PIF_VALUE: 25
PIF_VALUE: 22
PIF_VALUE: 24

## 2021-08-17 ASSESSMENT — PAIN DESCRIPTION - DESCRIPTORS
DESCRIPTORS: ACHING
DESCRIPTORS: PRESSURE;ACHING;SHARP;THROBBING

## 2021-08-17 ASSESSMENT — PAIN DESCRIPTION - PAIN TYPE
TYPE: SURGICAL PAIN

## 2021-08-17 ASSESSMENT — PAIN SCALES - GENERAL
PAINLEVEL_OUTOF10: 4
PAINLEVEL_OUTOF10: 0
PAINLEVEL_OUTOF10: 5
PAINLEVEL_OUTOF10: 4
PAINLEVEL_OUTOF10: 3

## 2021-08-17 ASSESSMENT — PAIN - FUNCTIONAL ASSESSMENT: PAIN_FUNCTIONAL_ASSESSMENT: 0-10

## 2021-08-17 NOTE — H&P
History and Physical        CHIEF COMPLAINT:  Right knee pain    HISTORY OF PRESENT ILLNESS:      The patient is a 63 y.o. female  who presents with right knee pain. Patient has had a history of bilateral knee pain. She has back in May finished in Orthovisc series of injections to her right knee. She did not notice that this improved her pain but actually noticed it worsening. Pain was worse with any weightbearing or twisting maneuvers. Pain was relieved with rest.  She feels as if her right knee wants to buckle on her. She has had 2 previous right knee scopes for meniscal tears in the past.  Her last knee scope surgery was in January 2020. She denies any recent injury that she can recall. She did undergo an MRI of her right knee on June 4, 2021. She was noted to have a oblique undersurface tear in the posterior horn and body of the medial meniscus. 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 Dr. Calvin Almonte did review MRI and felt she would be best treated with a right knee scope for her new medial meniscus tear. Past Medical History:    Past Medical History:   Diagnosis Date    Diabetes mellitus ([address]     GERD (gastroesophageal reflux disease)     Heart murmur     Hyperlipidemia     Hypertension     Mitral valve regurgitation     S/P arthroscopy of right knee        Past Surgical History:    Past Surgical History:   Procedure Laterality Date    BUNIONECTOMY Bilateral     CARPAL TUNNEL RELEASE Bilateral     CHOLECYSTECTOMY      COLONOSCOPY      FINGER TRIGGER RELEASE Left 2010    Left thumb    KNEE ARTHROSCOPY Right     w/ menisectomy    KNEE ARTHROSCOPY Right 01/24/2020       Medications Prior to Admission:   Prior to Admission medications    Medication Sig Start Date End Date Taking?  Authorizing Provider   [facility] ARTHRITIS PAIN 650 MG extended release tablet TAKE (1) TABLET BY MOUTH EVERY 8 HOURS AS NEEDED 5/19/21  Yes Historical Provider, MD   losartan (COZAAR) 100 MG tablet TAKE 1 TABLET (100 MG TOTAL) BY MOUTH DAILY. 5/20/21  Yes Historical Provider, MD   aspirin 81 MG EC tablet Take 81 mg by mouth daily   Yes Historical Provider, MD   Biotin 44685 MCG TBDP Take 1 tablet by mouth daily   Yes Historical Provider, MD   carvedilol (COREG) 25 MG tablet Take 25 mg by mouth 2 times daily   Yes Historical Provider, MD   famotidine (PEPCID) 40 MG tablet Take 40 mg by mouth 2 times daily 3/11/21 3/11/22 Yes Historical Provider, MD   gemfibrozil (LOPID) 600 MG tablet Take 600 mg by mouth 2 times daily 8/31/20  Yes Historical Provider, MD   metFORMIN (GLUCOPHAGE) 850 MG tablet Take 850 mg by mouth 2 times daily  3/8/21 9/4/21 Yes Historical Provider, MD   rosuvastatin (CRESTOR) 10 MG tablet Take 10 mg by mouth daily   Yes Historical Provider, MD   Acidophilus Lactobacillus CAPS Take 1 tablet by mouth daily   Yes Historical Provider, MD   Multiple Vitamin (MULTI VITAMIN DAILY PO) Take by mouth   Yes Historical Provider, MD   loratadine (CLARITIN) 10 MG capsule Take 10 mg by mouth as needed   Yes Historical Provider, MD   lipase-protease-amylase (CREON) 96810-47485 units delayed release capsule Take 1 capsule by mouth 2/19/21 7/6/21  Historical Provider, MD    Scheduled Meds:  Continuous Infusions:  PRN Meds:.     Allergies:  Fluticasone and Hydrocodone-acetaminophen    Social History:   Social History     Socioeconomic History    Marital status:      Spouse name: None    Number of children: None    Years of education: None    Highest education level: None   Occupational History    None   Tobacco Use    Smoking status: Never Smoker    Smokeless tobacco: Never Used   Vaping Use    Vaping Use: Never used   Substance and Sexual Activity    Alcohol use: No    Drug use: No    Sexual activity: None   Other Topics Concern    None   Social History Narrative    None     Social Determinants of Health     Financial Resource Strain:     Difficulty of Paying Living Expenses:    Food Insecurity:     Worried About Running Out of Food in the Last Year:     [address] in the Last Year:    Transportation Needs:     Lack of Transportation (Medical):  Lack of Transportation (Non-Medical):    Physical Activity:     Days of Exercise per Week:     Minutes of Exercise per Session:    Stress:     Feeling of Stress :    Social Connections:     Frequency of Communication with Friends and Family:     Frequency of Social Gatherings with Friends and Family:     Attends [employer] Services:     Active Member of Clubs or Organizations:     Attends Club or Organization Meetings:     Marital Status:    Intimate Partner Violence:     Fear of Current or Ex-Partner:     Emotionally Abused:     Physically Abused:     Sexually Abused:      Social History     Tobacco Use   Smoking Status Never Smoker   Smokeless Tobacco Never Used     Social History     Substance and Sexual Activity   Alcohol Use No     Social History     Substance and Sexual Activity   Drug Use No       Family History:  History reviewed. No pertinent family history. REVIEW OF SYSTEMS:  Constitutional: Denies any fever, chills. Derm: Denies any rash or skin color change. Eyes: Denies blurred or decreased in vision. Ent: Denies any tinnitus or vertigo. Resp: Denies any cough or shortness of breath. CV: Denies any syncope, palpitations or chest pain. GI:  Denies any abdominal pain, nausea, vomiting, constipation or diarrhea. : Denies any hematuria, hesitancy, or dysuria. Heme/Lymph: Denies any bleeding. Musculoskeletal:Positive for right knee pain. Neuro: Denies any dizziness, paresthesia or weakness. PHYSICAL EXAM:  Patient Vitals for the past 24 hrs:   BP Temp Temp src Pulse Resp SpO2 Height Weight   08/17/21 0730 128/64 96.8 °F (36 °C) Tympanic 69 16 97 % 5' 3\" (1.6 m) 166 lb 6.4 oz (75.5 kg)     General appearance:  Alert and oriented x 3.  No apparent distress, appears stated age and cooperative. HEENT:  Normal cephalic, atraumatic without obvious deformity. Pupils equal, round, and reactive to light. Conjunctivae/corneas clear. Neck: Supple, with full range of motion. Trachea midline. Respiratory:  Normal respiratory effort. No audible Wheezes or Rhonchi. Cardiovascular:  Regular rate and rhythm. Abdomen: Soft, non-tender, non-distended. Musculoskeletal:   Right lower extremity :  Denies calf pain to palpation. Pain with range of motion without deformity. Pt can flex and extend right toes. Skin is intact no rashes lesions or drainage. No redness warmth or cellulitis noted. Small joint effusion is noted. Significant medial joint line pain to palpation. Positive Samir's test.  Good stability to varus and valgus stress testing anterior and posterior stresses. Full extension is noted flexion around 115 degrees. .   Skin: Skin color, texture, turgor normal.  No rashes or lesions. Neurologic:  Neurovascularly intact without any focal sensory/motor deficits. Sensation intact. DATA:  CBC:   Lab Results   Component Value Date    WBC 8.8 06/14/2021    HGB 13.3 06/14/2021     06/14/2021     BMP:    Lab Results   Component Value Date     06/14/2021    K 3.8 06/14/2021     06/14/2021    CO2 27 06/14/2021    BUN 16 06/14/2021    CREATININE 0.87 06/14/2021    CALCIUM 10.1 06/14/2021    GLUCOSE 180 06/14/2021     PT/INR:  No results found for: PROTIME, INR  Troponin:  No results found for: TROPONINI  No results for input(s): LIPASE, AMYLASE in the last 72 hours. No results for input(s): AST, ALT, BILIDIR, BILITOT, ALKPHOS in the last 72 hours. Radiology:  No results found. ASSESSMENT:Active Problems:    * No active hospital problems. *  Resolved Problems:    * No resolved hospital problems. *   right medial meniscus tear     PLAN as discussed with Dr. Ave Ellis:  1. Plan at this time Dr. Ave Ellis thinks patient would be best treated with her right knee scope. Surgical risks and benefits were discussed with patient. Patient elected to proceed. The patient was counseled at length about the risks of bernard Covid-19 during their perioperative period and any recovery window from their procedure. The patient was made aware that bernard Covid-19  may worsen their prognosis for recovering from their procedure  and lend to a higher morbidity and/or mortality risk. All material risks, benefits, and reasonable alternatives including postponing the procedure were discussed. The patient does wish to proceed with the procedure at this time.          Electronically signed by NATHANIEL Ellis CNP on 8/17/2021 at 7:49 AM

## 2021-08-17 NOTE — ANESTHESIA PRE PROCEDURE
capsule Take 1 capsule by mouth 2/19/21 7/6/21  Historical Provider, MD       Current medications:    Current Facility-Administered Medications   Medication Dose Route Frequency Provider Last Rate Last Admin    sodium chloride flush 0.9 % injection 5-40 mL  5-40 mL Intravenous 2 times per day Mimi Gabriel MD        sodium chloride flush 0.9 % injection 5-40 mL  5-40 mL Intravenous PRN Mimi Gabriel MD        0.9 % sodium chloride infusion  25 mL Intravenous PRN Mimi Gabriel MD        lactated ringers infusion   Intravenous Continuous Mimi Gabriel  mL/hr at 08/17/21 0805 New Bag at 08/17/21 0805    scopolamine (TRANSDERM-SCOP) transdermal patch 1 patch  1 patch Transdermal Once Mimi Gabriel MD   1 patch at 08/17/21 0815       Allergies: Allergies   Allergen Reactions    Fluticasone Shortness Of Breath and Swelling     Swelling of hands    Hydrocodone-Acetaminophen Itching       Problem List:    Patient Active Problem List   Diagnosis Code    Tear of medial meniscus of right knee, current S83.241A       Past Medical History:        Diagnosis Date    Diabetes mellitus ([address]     GERD (gastroesophageal reflux disease)     Heart murmur     Hyperlipidemia     Hypertension     Mitral valve regurgitation     S/P arthroscopy of right knee        Past Surgical History:        Procedure Laterality Date    BUNIONECTOMY Bilateral     CARPAL TUNNEL RELEASE Bilateral     CHOLECYSTECTOMY      COLONOSCOPY      FINGER TRIGGER RELEASE Left 2010    Left thumb    KNEE ARTHROSCOPY Right     w/ menisectomy    KNEE ARTHROSCOPY Right 01/24/2020       Social History:    Social History     Tobacco Use    Smoking status: Never Smoker    Smokeless tobacco: Never Used   Substance Use Topics    Alcohol use:  No                                Counseling given: Not Answered      Vital Signs (Current):   Vitals:    08/17/21 0730   BP: 128/64   Pulse: 69   Resp: 16   Temp: 36 °C (96.8 °F)   TempSrc: Tympanic   SpO2: 97%   Weight: 166 lb 6.4 oz (75.5 kg)   Height: 5' 3\" (1.6 m)                                              BP Readings from Last 3 Encounters:   08/17/21 128/64   08/17/21 (!) 66/38   07/06/21 108/62       NPO Status: Time of last liquid consumption: 2200                        Time of last solid consumption: 1800                        Date of last liquid consumption: 08/16/21 (sip with meds this am)                        Date of last solid food consumption: 08/16/21    BMI:   Wt Readings from Last 3 Encounters:   08/17/21 166 lb 6.4 oz (75.5 kg)   07/06/21 175 lb (79.4 kg)   06/01/21 175 lb (79.4 kg)     Body mass index is 29.48 kg/m². CBC:   Lab Results   Component Value Date    WBC 8.8 06/14/2021    RBC 4.25 06/14/2021    HGB 13.3 06/14/2021    HCT 40.6 06/14/2021    MCV 95.5 06/14/2021    RDW 12.7 06/14/2021     06/14/2021       CMP:   Lab Results   Component Value Date     06/14/2021    K 3.8 06/14/2021     06/14/2021    CO2 27 06/14/2021    BUN 16 06/14/2021    CREATININE 0.87 06/14/2021    GFRAA >60 06/14/2021    LABGLOM >60 06/14/2021    GLUCOSE 180 06/14/2021    CALCIUM 10.1 06/14/2021       POC Tests:   Recent Labs     08/17/21  0809   POCGLU 153*       Coags: No results found for: PROTIME, INR, APTT    HCG (If Applicable): No results found for: PREGTESTUR, PREGSERUM, HCG, HCGQUANT     ABGs: No results found for: PHART, PO2ART, RSP2BBD, NBP5EWP, BEART, G0KVWDFD     Type & Screen (If Applicable):  No results found for: LABABO, LABRH    Drug/Infectious Status (If Applicable):  No results found for: HIV, HEPCAB    COVID-19 Screening (If Applicable): No results found for: COVID19        Anesthesia Evaluation  Patient summary reviewed and Nursing notes reviewed   history of anesthetic complications: PONV.   Airway: Mallampati: II  TM distance: >3 FB   Neck ROM: full  Mouth opening: > = 3 FB Dental: normal exam         Pulmonary:Negative Pulmonary ROS and normal exam Cardiovascular:    (+) hypertension:,       ECG reviewed                        Neuro/Psych:   Negative Neuro/Psych ROS              GI/Hepatic/Renal:   (+) GERD:,           Endo/Other:    (+) Diabetes, . Abdominal:             Vascular: Other Findings:             Anesthesia Plan      general     ASA 3       Induction: intravenous. MIPS: Postoperative opioids intended and Prophylactic antiemetics administered. Anesthetic plan and risks discussed with patient. Use of blood products discussed with whom consented to blood products.                    NATHANIEL Brown - CRNA   8/17/2021

## 2021-08-17 NOTE — OP NOTE
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 Lateral compartment appeared to be pristine ACL was intact no loose bodies noted some synovitis which was taken down using oscillating shaver    Detailed Description of Procedure: Indications  This is a 70-year-old female history of right knee pain for quite some time. Tried activity modification pain occasional relief. MRI concerning for meniscus tear. She had previous meniscectomies felt she would benefit from arthroscopic evaluation and treatment. Patient agreed. Narrative  Patient the operating underwent a general anesthetic. Right leg was placed in a standard leg mcdermott with a nonsterile tourniquet. Underwent a standard prepping and draping. Timeout was taken consent was confirmed. Started by creating an inferior lateral portal inserted the scope within the knee the knee was then infused with fluid. Created arthroscopic medial portal using a spinal needle. Began evaluating the meniscus root and see where the previous meniscectomy was. The meniscus was probed circumferentially notes no further tears. There are some grade III chondromalacia noted in the compartment minimal fraying was noted though. ACL was intact. Lateral compartment essentially pristine.   Suprapatellar pouch

## 2021-08-31 ENCOUNTER — OFFICE VISIT (OUTPATIENT)
Dept: ORTHOPEDIC SURGERY | Age: 61
End: 2021-08-31
Payer: COMMERCIAL

## 2021-08-31 VITALS
SYSTOLIC BLOOD PRESSURE: 122 MMHG | HEIGHT: 63 IN | HEART RATE: 70 BPM | DIASTOLIC BLOOD PRESSURE: 64 MMHG | BODY MASS INDEX: 29.41 KG/M2 | WEIGHT: 166 LBS

## 2021-08-31 DIAGNOSIS — Z98.890 HISTORY OF ARTHROSCOPY OF RIGHT KNEE: Primary | ICD-10-CM

## 2021-08-31 PROCEDURE — 99024 POSTOP FOLLOW-UP VISIT: CPT | Performed by: PHYSICIAN ASSISTANT

## 2021-08-31 PROCEDURE — 99213 OFFICE O/P EST LOW 20 MIN: CPT | Performed by: PHYSICIAN ASSISTANT

## 2021-08-31 NOTE — PROGRESS NOTES
Orthopaedic Progress Note      CHIEF COMPLAINT: Right knee pain    HISTORY OF PRESENT ILLNESS:       Ms. You Hurtado  is a 63 y.o. female  who presents with a history of right knee pain. She underwent a right knee operative arthroscopy limited synovectomy about 2 weeks ago. She states she is feeling much better at this time. She has no interval complaints. Past Medical History:    Past Medical History:   Diagnosis Date    Diabetes mellitus ([address]     GERD (gastroesophageal reflux disease)     Heart murmur     Hyperlipidemia     Hypertension     Mitral valve regurgitation     S/P arthroscopy of right knee        Past Surgical History:    Past Surgical History:   Procedure Laterality Date    BUNIONECTOMY Bilateral     CARPAL TUNNEL RELEASE Bilateral     CHOLECYSTECTOMY      COLONOSCOPY      FINGER TRIGGER RELEASE Left 2010    Left thumb    KNEE ARTHROSCOPY Right     w/ menisectomy    KNEE ARTHROSCOPY Right 01/24/2020    KNEE ARTHROSCOPY Right 8/17/2021    Right Knee Arthroscopy, limited synovectomy performed by Chava Cheng MD at [facility] OR         Current  Medications:  Current Outpatient Medications   Medication Sig Dispense Refill    GOODSENSE ARTHRITIS PAIN 650 MG extended release tablet TAKE (1) TABLET BY MOUTH EVERY 8 HOURS AS NEEDED      losartan (COZAAR) 100 MG tablet TAKE 1 TABLET (100 MG TOTAL) BY MOUTH DAILY.       aspirin 81 MG EC tablet Take 81 mg by mouth daily      Biotin 31789 MCG TBDP Take 1 tablet by mouth daily      carvedilol (COREG) 25 MG tablet Take 25 mg by mouth 2 times daily      famotidine (PEPCID) 40 MG tablet Take 40 mg by mouth 2 times daily      gemfibrozil (LOPID) 600 MG tablet Take 600 mg by mouth 2 times daily      metFORMIN (GLUCOPHAGE) 850 MG tablet Take 850 mg by mouth 2 times daily       rosuvastatin (CRESTOR) 10 MG tablet Take 10 mg by mouth daily      Acidophilus Lactobacillus CAPS Take 1 tablet by mouth daily      Multiple Vitamin (MULTI VITAMIN DAILY PO) Take by mouth      loratadine (CLARITIN) 10 MG capsule Take 10 mg by mouth as needed      lipase-protease-amylase (CREON) 52519-68270 units delayed release capsule Take 1 capsule by mouth       No current facility-administered medications for this visit. Allergies:  Fluticasone and Hydrocodone-acetaminophen    Social History:   Social History     Tobacco Use   Smoking Status Never Smoker   Smokeless Tobacco Never Used     Social History     Substance and Sexual Activity   Alcohol Use No     Social History     Substance and Sexual Activity   Drug Use No       Family History:  History reviewed. No pertinent family history. REVIEW OF SYSTEMS:  Constitutional: Denies any fever, chills. Musculoskeletal: Positive for right knee pain. PHYSICAL EXAM:  [unfilled]  General appearance:  Alert and oriented x 3. No apparent distress, appears stated age, calm and cooperative. Musculoskeletal: Right knee was inspected anterior based incisions are well-healed. Stitch removed today without complication difficulty. Knee motion is nearly full. She has no calf pain. She is neurovascular intact foot. Alhaji Botello DATA:  CBC:   Lab Results   Component Value Date    WBC 8.8 06/14/2021    HGB 13.3 06/14/2021     06/14/2021     BMP:    Lab Results   Component Value Date     06/14/2021    K 3.8 06/14/2021     06/14/2021    CO2 27 06/14/2021    BUN 16 06/14/2021    CREATININE 0.87 06/14/2021    CALCIUM 10.1 06/14/2021    GLUCOSE 180 06/14/2021     PT/INR:  No results found for: PROTIME, INR  Troponin:  No results found for: TROPONINI  No results for input(s): LIPASE, AMYLASE in the last 72 hours. No results for input(s): AST, ALT, BILIDIR, BILITOT, ALKPHOS in the last 72 hours. Uric Acid:  No components found for: URIC  Urine Culture:  No components found for: CURINE    Radiology:   No results found. Diagnosis Orders   1.  History of arthroscopy of right knee           PLAN:  Continue with activities as tolerated, weightbearing as tolerated, see us back here as needed    No orders of the defined types were placed in this encounter.        Procedure:        Electronically signed by Micheal Nayak PA-C on 8/31/2021 at 9:25 AM

## 2022-03-23 DIAGNOSIS — M65.30 TRIGGER FINGER OF LEFT HAND, UNSPECIFIED FINGER: Primary | ICD-10-CM

## 2022-03-29 ENCOUNTER — OFFICE VISIT (OUTPATIENT)
Dept: ORTHOPEDIC SURGERY | Age: 62
End: 2022-03-29
Payer: MEDICARE

## 2022-03-29 ENCOUNTER — TELEPHONE (OUTPATIENT)
Dept: ORTHOPEDIC SURGERY | Age: 62
End: 2022-03-29

## 2022-03-29 ENCOUNTER — HOSPITAL ENCOUNTER (OUTPATIENT)
Dept: GENERAL RADIOLOGY | Age: 62
Discharge: HOME | End: 2022-03-31
Payer: MEDICARE

## 2022-03-29 VITALS
HEIGHT: 63 IN | HEART RATE: 79 BPM | WEIGHT: 166 LBS | DIASTOLIC BLOOD PRESSURE: 70 MMHG | BODY MASS INDEX: 29.41 KG/M2 | SYSTOLIC BLOOD PRESSURE: 110 MMHG

## 2022-03-29 DIAGNOSIS — M65.30 TRIGGER FINGER OF LEFT HAND, UNSPECIFIED FINGER: ICD-10-CM

## 2022-03-29 DIAGNOSIS — M65.322 TRIGGER INDEX FINGER OF LEFT HAND: Primary | ICD-10-CM

## 2022-03-29 DIAGNOSIS — M67.449 DIGITAL MUCINOUS CYST OF FINGER: ICD-10-CM

## 2022-03-29 PROCEDURE — 73130 X-RAY EXAM OF HAND: CPT

## 2022-03-29 PROCEDURE — 99213 OFFICE O/P EST LOW 20 MIN: CPT | Performed by: PHYSICIAN ASSISTANT

## 2022-03-29 PROCEDURE — 99215 OFFICE O/P EST HI 40 MIN: CPT | Performed by: PHYSICIAN ASSISTANT

## 2022-03-29 RX ORDER — MONTELUKAST SODIUM 4 MG/1
TABLET, CHEWABLE ORAL
COMMUNITY
Start: 2022-02-23

## 2022-03-29 NOTE — TELEPHONE ENCOUNTER
[facility]    Pre-Operative Evaluation/Consultation    Name:  Stacy Michaels                                         Age:  63 y.o. MRN:  [id number]       :  1960   Date:  3/29/2022         Sex: female    There were no encounter diagnoses. Surgeon:  Dr. Leopold Cornell  Procedure (Planned):  Left hand surgery  Date Scheduled surgery: 22    Attending : No att. providers found    Primary Physician:abram Parham      Type of Anesthesia Requested: Anesthesia Provider's Choice    Patient Medical history: Allergies   Allergen Reactions    Fluticasone Shortness Of Breath and Swelling     Swelling of hands    Hydrocodone-Acetaminophen Itching     Patient Active Problem List   Diagnosis    Tear of medial meniscus of right knee, current     Past Medical History:   Diagnosis Date    Diabetes mellitus ([address]     GERD (gastroesophageal reflux disease)     Heart murmur     Hyperlipidemia     Hypertension     Mitral valve regurgitation     S/P arthroscopy of right knee      Past Surgical History:   Procedure Laterality Date    BUNIONECTOMY Bilateral     CARPAL TUNNEL RELEASE Bilateral     CHOLECYSTECTOMY      COLONOSCOPY      FINGER TRIGGER RELEASE Left 2010    Left thumb    KNEE ARTHROSCOPY Right     w/ menisectomy    KNEE ARTHROSCOPY Right 2020    KNEE ARTHROSCOPY Right 2021    Right Knee Arthroscopy, limited synovectomy performed by Chelsy Rodriges MD at [facility]     Social History     Tobacco Use    Smoking status: Never Smoker    Smokeless tobacco: Never Used   Vaping Use    Vaping Use: Never used   Substance Use Topics    Alcohol use: No    Drug use: No     Medications:  Current Outpatient Medications   Medication Sig Dispense Refill    Dentifrices (CLOSE-UP DT) Place onto teeth      colestipol (COLESTID) 1 g tablet TAKE 1 TABLET TWICE A DAY ONE HOUR AFTER OTHER MEDICATIONS AND AT LEAST FOUR HOURS PRIOR TO MEDICATIONS.  MONITOR FOR CONSTIPATION      Wendy Belcher ARTHRITIS PAIN 650 MG extended release tablet TAKE (1) TABLET BY MOUTH EVERY 8 HOURS AS NEEDED      losartan (COZAAR) 100 MG tablet 50 mg       aspirin 81 MG EC tablet Take 81 mg by mouth daily      Biotin 88086 MCG TBDP Take 1 tablet by mouth daily      carvedilol (COREG) 25 MG tablet Take 25 mg by mouth 2 times daily      gemfibrozil (LOPID) 600 MG tablet Take 600 mg by mouth 2 times daily      metFORMIN (GLUCOPHAGE) 850 MG tablet Take 850 mg by mouth 2 times daily       lipase-protease-amylase (CREON) 47616-86646 units delayed release capsule Take 1 capsule by mouth      rosuvastatin (CRESTOR) 10 MG tablet Take 10 mg by mouth daily      Acidophilus Lactobacillus CAPS Take 1 tablet by mouth daily      Multiple Vitamin (MULTI VITAMIN DAILY PO) Take by mouth      loratadine (CLARITIN) 10 MG capsule Take 10 mg by mouth as needed       No current facility-administered medications for this visit. Scheduled Meds:  Continuous Infusions:  PRN Meds:. Prior to Admission medications    Medication Sig Start Date End Date Taking? Authorizing Provider   Dentifrices (CLOSE-UP DT) Place onto teeth    Historical Provider, MD   colestipol (COLESTID) 1 g tablet TAKE 1 TABLET TWICE A DAY ONE HOUR AFTER OTHER MEDICATIONS AND AT LEAST FOUR HOURS PRIOR TO MEDICATIONS.  MONITOR FOR CONSTIPATION 2/23/22   Historical Provider, MD   GOODSENSE ARTHRITIS PAIN 650 MG extended release tablet TAKE (1) TABLET BY MOUTH EVERY 8 HOURS AS NEEDED 5/19/21   Historical Provider, MD   losartan (COZAAR) 100 MG tablet 50 mg  5/20/21   Historical Provider, MD   aspirin 81 MG EC tablet Take 81 mg by mouth daily    Historical Provider, MD   Biotin 61456 MCG TBDP Take 1 tablet by mouth daily    Historical Provider, MD   carvedilol (COREG) 25 MG tablet Take 25 mg by mouth 2 times daily    Historical Provider, MD   gemfibrozil (LOPID) 600 MG tablet Take 600 mg by mouth 2 times daily 8/31/20   Historical Provider, MD   metFORMIN (GLUCOPHAGE) 850 MG tablet Take 850 mg by mouth 2 times daily  3/8/21 3/29/22  Historical Provider, MD   lipase-protease-amylase (CREON) 41570-77604 units delayed release capsule Take 1 capsule by mouth 2/19/21 3/29/22  Historical Provider, MD   rosuvastatin (CRESTOR) 10 MG tablet Take 10 mg by mouth daily    Historical Provider, MD   Acidophilus Lactobacillus CAPS Take 1 tablet by mouth daily    Historical Provider, MD   Multiple Vitamin (MULTI VITAMIN DAILY PO) Take by mouth    Historical Provider, MD   loratadine (CLARITIN) 10 MG capsule Take 10 mg by mouth as needed    Historical Provider, MD     Vital Signs (Current) [unfilled]    Weight:   Wt Readings from Last 1 Encounters:   03/29/22 166 lb (75.3 kg)     Height:   Ht Readings from Last 1 Encounters:   03/29/22 5' 3\" (1.6 m)      BMI:  There is no height or weight on file to calculate BMI. Estimated body mass index is 29.41 kg/m² as calculated from the following:    Height as of an earlier encounter on 3/29/22: 5' 3\" (1.6 m). Weight as of an earlier encounter on 3/29/22: 166 lb (75.3 kg). body mass index is unknown because there is no height or weight on file. Preoperative Testing: These are the current and completed labs:  CBC:   Lab Results   Component Value Date    WBC 8.8 06/14/2021    RBC 4.25 06/14/2021    HGB 13.3 06/14/2021    HCT 40.6 06/14/2021    MCV 95.5 06/14/2021    RDW 12.7 06/14/2021     06/14/2021     CMP:   Lab Results   Component Value Date     06/14/2021    K 3.8 06/14/2021     06/14/2021    CO2 27 06/14/2021    BUN 16 06/14/2021    CREATININE 0.87 06/14/2021    GFRAA >60 06/14/2021    LABGLOM >60 06/14/2021    GLUCOSE 180 06/14/2021    CALCIUM 10.1 06/14/2021     POC Tests: No results for input(s): POCGLU, POCNA, POCK, POCCL, POCBUN, POCHEMO, POCHCT in the last 72 hours.   Coags  No results found for: PROTIME, INR, APTT  HCG (If Applicable) No results found for: PREGTESTUR, PREGSERUM, HCG, HCGQUANT   ABGs No results found

## 2022-03-29 NOTE — H&P
History and Physical        CHIEF COMPLAINT: Left hand pain    HISTORY OF PRESENT ILLNESS:      The patient is a 63 y.o. female  who presents with a history of left hand index finger pain as well as left small finger pain. She self reports that her index finger it becomes locked up in a flexed position. She has to straighten this out on her own. She states she has a trigger finger in the left hand index finger side. She has a history of a thumb trigger finger in the past.  She also comes in today complaining of small finger pain. She feels like she has a small cyst in the volar side of the left small finger. She is here today for evaluation. Both these issues become very problematic for her. After examining her I feel as though she would benefit from the left hand small finger retinacular cyst removed as well as the left hand index finger trigger finger release. Benefits versus risks were explained consent was obtained we will proceed as outlined. Past Medical History:    Past Medical History:   Diagnosis Date    Diabetes mellitus ([facility] [address]     GERD (gastroesophageal reflux disease)     Heart murmur     Hyperlipidemia     Hypertension     Mitral valve regurgitation     S/P arthroscopy of right knee        Past Surgical History:    Past Surgical History:   Procedure Laterality Date    BUNIONECTOMY Bilateral     CARPAL TUNNEL RELEASE Bilateral     CHOLECYSTECTOMY      COLONOSCOPY      FINGER TRIGGER RELEASE Left 2010    Left thumb    KNEE ARTHROSCOPY Right     w/ menisectomy    KNEE ARTHROSCOPY Right 01/24/2020    KNEE ARTHROSCOPY Right 8/17/2021    Right Knee Arthroscopy, limited synovectomy performed by Christ Garcia MD at [facility] OR       Medications Prior to Admission:   Prior to Admission medications    Medication Sig Start Date End Date Taking?  Authorizing Provider   Dentifrices (CLOSE-UP DT) Place onto teeth   Yes Historical Provider, MD   colestipol (COLESTID) 1 g tablet TAKE 1 TABLET TWICE A DAY ONE HOUR AFTER OTHER MEDICATIONS AND AT LEAST FOUR HOURS PRIOR TO MEDICATIONS. MONITOR FOR CONSTIPATION 2/23/22  Yes Historical Provider, MD   GOODSENSE ARTHRITIS PAIN 650 MG extended release tablet TAKE (1) TABLET BY MOUTH EVERY 8 HOURS AS NEEDED 5/19/21  Yes Historical Provider, MD   losartan (COZAAR) 100 MG tablet 50 mg  5/20/21  Yes Historical Provider, MD   aspirin 81 MG EC tablet Take 81 mg by mouth daily   Yes Historical Provider, MD   Biotin 11888 MCG TBDP Take 1 tablet by mouth daily   Yes Historical Provider, MD   carvedilol (COREG) 25 MG tablet Take 25 mg by mouth 2 times daily   Yes Historical Provider, MD   gemfibrozil (LOPID) 600 MG tablet Take 600 mg by mouth 2 times daily 8/31/20  Yes Historical Provider, MD   metFORMIN (GLUCOPHAGE) 850 MG tablet Take 850 mg by mouth 2 times daily  3/8/21 3/29/22 Yes Historical Provider, MD   lipase-protease-amylase (CREON) 91147-18800 units delayed release capsule Take 1 capsule by mouth 2/19/21 3/29/22 Yes Historical Provider, MD   rosuvastatin (CRESTOR) 10 MG tablet Take 10 mg by mouth daily   Yes Historical Provider, MD   Acidophilus Lactobacillus CAPS Take 1 tablet by mouth daily   Yes Historical Provider, MD   Multiple Vitamin (MULTI VITAMIN DAILY PO) Take by mouth   Yes Historical Provider, MD   loratadine (CLARITIN) 10 MG capsule Take 10 mg by mouth as needed   Yes Historical Provider, MD    Scheduled Meds:  Continuous Infusions:  PRN Meds:.     Allergies:  Fluticasone and Hydrocodone-acetaminophen    Social History:   Social History     Socioeconomic History    Marital status:      Spouse name: None    Number of children: None    Years of education: None    Highest education level: None   Occupational History    None   Tobacco Use    Smoking status: Never Smoker    Smokeless tobacco: Never Used   Vaping Use    Vaping Use: Never used   Substance and Sexual Activity    Alcohol use: No    Drug use: No    Sexual activity: None Other Topics Concern    None   Social History Narrative    None     Social Determinants of Health     Financial Resource Strain:     Difficulty of Paying Living Expenses: Not on file   Food Insecurity:     Worried About Running Out of Food in the Last Year: Not on file    Saeid of Food in the Last Year: Not on file   Transportation Needs:     Lack of Transportation (Medical): Not on file    Lack of Transportation (Non-Medical): Not on file   Physical Activity:     Days of Exercise per Week: Not on file    Minutes of Exercise per Session: Not on file   Stress:     Feeling of Stress : Not on file   Social Connections:     Frequency of Communication with Friends and Family: Not on file    Frequency of Social Gatherings with Friends and Family: Not on file    Attends Yarsani Services: Not on file    Active Member of [address] [place] [address] [employer] or Organizations: Not on file    Attends Club or Organization Meetings: Not on file    Marital Status: Not on file   Intimate Partner Violence:     Fear of Current or Ex-Partner: Not on file    Emotionally Abused: Not on file    Physically Abused: Not on file    Sexually Abused: Not on file   Housing Stability:     Unable to Pay for Housing in the Last Year: Not on file    Number of [place] in the Last Year: Not on file    Unstable Housing in the Last Year: Not on file     Social History     Tobacco Use   Smoking Status Never Smoker   Smokeless Tobacco Never Used     Social History     Substance and Sexual Activity   Alcohol Use No     Social History     Substance and Sexual Activity   Drug Use No       Family History:  History reviewed. No pertinent family history. REVIEW OF SYSTEMS:  Constitutional: Denies any fever, chills. Derm: Denies any rash or skin color change. Eyes: Denies blurred or decreased in vision. Ent: Denies any tinnitus or vertigo. Resp: Denies any cough or shortness of breath. CV: Denies any syncope, palpitations or chest pain.   GI:  Denies any abdominal pain, nausea, vomiting, constipation or diarrhea. : Denies any hematuria, hesitancy, or dysuria. Heme/Lymph: Denies any bleeding. Musculoskeletal: Positive for left hand pain. Neuro: Denies any dizziness, paresthesia or weakness. PHYSICAL EXAM:  [unfilled]  General appearance:  Alert and oriented x 3. No apparent distress, appears stated age and cooperative. HEENT:  Normal cephalic, atraumatic without obvious deformity. Pupils equal, round, and reactive to light. Conjunctivae/corneas clear. Neck: Supple, with full range of motion. Trachea midline. Respiratory:  Normal respiratory effort. No audible Wheezes or Rhonchi. Cardiovascular:  Regular rate and rhythm. Abdomen: Soft, non-tender, non-distended. Musculoskeletal:   Left hand was inspected. Skin is good repair. There is no erythema no increased warmth no cellulitis. Small finger was then inspected. She has a small retinacular cyst of the volar side near the metacarpal phalangeal joint. It is painful to touch. It is freely mobile. She is grossly neurovascular intact the small finger. Index finger was then inspected. She has significant pain to palpation directly over the A1 pulley. She has active triggering of the index finger today. She is grossly neurovascular intact. Skin: Skin color, texture, turgor normal.  No rashes or lesions. Neurologic:  Neurovascularly intact without any focal sensory/motor deficits. Sensation intact.        DATA:  CBC:   Lab Results   Component Value Date    WBC 8.8 06/14/2021    HGB 13.3 06/14/2021     06/14/2021     BMP:    Lab Results   Component Value Date     06/14/2021    K 3.8 06/14/2021     06/14/2021    CO2 27 06/14/2021    BUN 16 06/14/2021    CREATININE 0.87 06/14/2021    CALCIUM 10.1 06/14/2021    GLUCOSE 180 06/14/2021     PT/INR:  No results found for: PROTIME, INR  Troponin:  No results found for: TROPONINI  No results for input(s): LIPASE, AMYLASE in the last 72 hours. No results for input(s): AST, ALT, BILIDIR, BILITOT, ALKPHOS in the last 72 hours. Radiology:  No results found. ASSESSMENT:Active Problems:    * No active hospital problems. *  Resolved Problems:    * No resolved hospital problems. *  Left hand small finger retinacular cyst.  Left hand index finger trigger finger    PLAN as discussed with Dr. Gamble Brain:  1. I do feel as though she benefit of pain with left hand index finger trigger finger release as well as left hand small finger retinacular cyst excision. Benefits versus risks were explained consent was obtained we will proceed as outlined    The patient was counseled at length about the risks of bernard Covid-19 during their perioperative period and any recovery window from their procedure. The patient was made aware that bernard Covid-19  may worsen their prognosis for recovering from their procedure  and lend to a higher morbidity and/or mortality risk. All material risks, benefits, and reasonable alternatives including postponing the procedure were discussed. The patient does wish to proceed with the procedure at this time.          Electronically signed by Lito Bello PA-C on 3/29/2022 at 8:17 AM

## 2022-03-31 RX ORDER — OXYCODONE HYDROCHLORIDE 5 MG/1
5 TABLET ORAL PRN
Status: CANCELLED | OUTPATIENT
Start: 2022-03-31 | End: 2022-03-31

## 2022-03-31 RX ORDER — SODIUM CHLORIDE 9 MG/ML
INJECTION, SOLUTION INTRAVENOUS PRN
Status: CANCELLED | OUTPATIENT
Start: 2022-03-31

## 2022-03-31 RX ORDER — OXYCODONE HYDROCHLORIDE 5 MG/1
10 TABLET ORAL PRN
Status: CANCELLED | OUTPATIENT
Start: 2022-03-31 | End: 2022-03-31

## 2022-03-31 RX ORDER — ONDANSETRON 2 MG/ML
4 INJECTION INTRAMUSCULAR; INTRAVENOUS PRN
Status: CANCELLED | OUTPATIENT
Start: 2022-03-31

## 2022-03-31 RX ORDER — SODIUM CHLORIDE 0.9 % (FLUSH) 0.9 %
5-40 SYRINGE (ML) INJECTION PRN
Status: CANCELLED | OUTPATIENT
Start: 2022-03-31

## 2022-03-31 RX ORDER — DIPHENHYDRAMINE HYDROCHLORIDE 50 MG/ML
12.5 INJECTION INTRAMUSCULAR; INTRAVENOUS
Status: CANCELLED | OUTPATIENT
Start: 2022-03-31 | End: 2022-03-31

## 2022-03-31 RX ORDER — MORPHINE SULFATE 2 MG/ML
2 INJECTION, SOLUTION INTRAMUSCULAR; INTRAVENOUS EVERY 5 MIN PRN
Status: CANCELLED | OUTPATIENT
Start: 2022-03-31

## 2022-03-31 RX ORDER — SODIUM CHLORIDE 0.9 % (FLUSH) 0.9 %
5-40 SYRINGE (ML) INJECTION EVERY 12 HOURS SCHEDULED
Status: CANCELLED | OUTPATIENT
Start: 2022-03-31

## 2022-03-31 RX ORDER — FENTANYL CITRATE 50 UG/ML
25 INJECTION, SOLUTION INTRAMUSCULAR; INTRAVENOUS EVERY 5 MIN PRN
Status: CANCELLED | OUTPATIENT
Start: 2022-03-31

## 2022-04-05 ENCOUNTER — HOSPITAL ENCOUNTER (OUTPATIENT)
Age: 62
Setting detail: OUTPATIENT SURGERY
Discharge: HOME | End: 2022-04-05
Attending: ORTHOPAEDIC SURGERY | Admitting: ORTHOPAEDIC SURGERY
Payer: MEDICARE

## 2022-04-05 ENCOUNTER — ANESTHESIA (OUTPATIENT)
Dept: OPERATING ROOM | Age: 62
End: 2022-04-05
Payer: MEDICARE

## 2022-04-05 ENCOUNTER — ANESTHESIA EVENT (OUTPATIENT)
Dept: OPERATING ROOM | Age: 62
End: 2022-04-05
Payer: MEDICARE

## 2022-04-05 VITALS
WEIGHT: 147.25 LBS | TEMPERATURE: 97.6 F | HEIGHT: 63 IN | OXYGEN SATURATION: 96 % | SYSTOLIC BLOOD PRESSURE: 111 MMHG | HEART RATE: 66 BPM | RESPIRATION RATE: 16 BRPM | DIASTOLIC BLOOD PRESSURE: 58 MMHG | BODY MASS INDEX: 26.09 KG/M2

## 2022-04-05 VITALS — DIASTOLIC BLOOD PRESSURE: 45 MMHG | SYSTOLIC BLOOD PRESSURE: 81 MMHG | OXYGEN SATURATION: 93 %

## 2022-04-05 DIAGNOSIS — Z98.890 S/P EXCISION OF GANGLION CYST: ICD-10-CM

## 2022-04-05 DIAGNOSIS — Z98.890 S/P TRIGGER FINGER RELEASE: Primary | ICD-10-CM

## 2022-04-05 PROCEDURE — 2500000003 HC RX 250 WO HCPCS: Performed by: ORTHOPAEDIC SURGERY

## 2022-04-05 PROCEDURE — 26160 REMOVE TENDON SHEATH LESION: CPT | Performed by: ORTHOPAEDIC SURGERY

## 2022-04-05 PROCEDURE — 2580000003 HC RX 258: Performed by: ORTHOPAEDIC SURGERY

## 2022-04-05 PROCEDURE — 3600000002 HC SURGERY LEVEL 2 BASE: Performed by: ORTHOPAEDIC SURGERY

## 2022-04-05 PROCEDURE — 7100000010 HC PHASE II RECOVERY - FIRST 15 MIN: Performed by: ORTHOPAEDIC SURGERY

## 2022-04-05 PROCEDURE — 6360000002 HC RX W HCPCS: Performed by: NURSE ANESTHETIST, CERTIFIED REGISTERED

## 2022-04-05 PROCEDURE — 2709999900 HC NON-CHARGEABLE SUPPLY: Performed by: ORTHOPAEDIC SURGERY

## 2022-04-05 PROCEDURE — 3600000012 HC SURGERY LEVEL 2 ADDTL 15MIN: Performed by: ORTHOPAEDIC SURGERY

## 2022-04-05 PROCEDURE — 3700000000 HC ANESTHESIA ATTENDED CARE: Performed by: ORTHOPAEDIC SURGERY

## 2022-04-05 PROCEDURE — 7100000011 HC PHASE II RECOVERY - ADDTL 15 MIN: Performed by: ORTHOPAEDIC SURGERY

## 2022-04-05 PROCEDURE — 3700000001 HC ADD 15 MINUTES (ANESTHESIA): Performed by: ORTHOPAEDIC SURGERY

## 2022-04-05 PROCEDURE — 2580000003 HC RX 258: Performed by: NURSE ANESTHETIST, CERTIFIED REGISTERED

## 2022-04-05 PROCEDURE — 26055 INCISE FINGER TENDON SHEATH: CPT | Performed by: ORTHOPAEDIC SURGERY

## 2022-04-05 RX ORDER — FENTANYL CITRATE 50 UG/ML
INJECTION, SOLUTION INTRAMUSCULAR; INTRAVENOUS PRN
Status: DISCONTINUED | OUTPATIENT
Start: 2022-04-05 | End: 2022-04-05 | Stop reason: SDUPTHER

## 2022-04-05 RX ORDER — SODIUM CHLORIDE, SODIUM LACTATE, POTASSIUM CHLORIDE, CALCIUM CHLORIDE 600; 310; 30; 20 MG/100ML; MG/100ML; MG/100ML; MG/100ML
INJECTION, SOLUTION INTRAVENOUS CONTINUOUS
Status: DISCONTINUED | OUTPATIENT
Start: 2022-04-05 | End: 2022-04-05 | Stop reason: HOSPADM

## 2022-04-05 RX ORDER — MIDAZOLAM HYDROCHLORIDE 1 MG/ML
INJECTION INTRAMUSCULAR; INTRAVENOUS PRN
Status: DISCONTINUED | OUTPATIENT
Start: 2022-04-05 | End: 2022-04-05 | Stop reason: SDUPTHER

## 2022-04-05 RX ORDER — SODIUM CHLORIDE 0.9 % (FLUSH) 0.9 %
5-40 SYRINGE (ML) INJECTION PRN
Status: DISCONTINUED | OUTPATIENT
Start: 2022-04-05 | End: 2022-04-05 | Stop reason: HOSPADM

## 2022-04-05 RX ORDER — SODIUM CHLORIDE, SODIUM LACTATE, POTASSIUM CHLORIDE, CALCIUM CHLORIDE 600; 310; 30; 20 MG/100ML; MG/100ML; MG/100ML; MG/100ML
INJECTION, SOLUTION INTRAVENOUS CONTINUOUS PRN
Status: DISCONTINUED | OUTPATIENT
Start: 2022-04-05 | End: 2022-04-05 | Stop reason: SDUPTHER

## 2022-04-05 RX ORDER — PROPOFOL 10 MG/ML
INJECTION, EMULSION INTRAVENOUS PRN
Status: DISCONTINUED | OUTPATIENT
Start: 2022-04-05 | End: 2022-04-05

## 2022-04-05 RX ORDER — TRAMADOL HYDROCHLORIDE 50 MG/1
50 TABLET ORAL EVERY 6 HOURS PRN
Qty: 20 TABLET | Refills: 0 | Status: SHIPPED | OUTPATIENT
Start: 2022-04-05 | End: 2022-04-10

## 2022-04-05 RX ORDER — PROPOFOL 10 MG/ML
INJECTION, EMULSION INTRAVENOUS CONTINUOUS PRN
Status: DISCONTINUED | OUTPATIENT
Start: 2022-04-05 | End: 2022-04-05 | Stop reason: SDUPTHER

## 2022-04-05 RX ADMIN — FENTANYL CITRATE 100 MCG: 50 INJECTION, SOLUTION INTRAMUSCULAR; INTRAVENOUS at 08:03

## 2022-04-05 RX ADMIN — SODIUM CHLORIDE, POTASSIUM CHLORIDE, SODIUM LACTATE AND CALCIUM CHLORIDE: 600; 310; 30; 20 INJECTION, SOLUTION INTRAVENOUS at 07:30

## 2022-04-05 RX ADMIN — SODIUM CHLORIDE, POTASSIUM CHLORIDE, SODIUM LACTATE AND CALCIUM CHLORIDE: 600; 310; 30; 20 INJECTION, SOLUTION INTRAVENOUS at 08:00

## 2022-04-05 RX ADMIN — PROPOFOL 150 MCG/KG/MIN: 10 INJECTION, EMULSION INTRAVENOUS at 08:02

## 2022-04-05 RX ADMIN — MIDAZOLAM HYDROCHLORIDE 2 MG: 1 INJECTION, SOLUTION INTRAMUSCULAR; INTRAVENOUS at 08:03

## 2022-04-05 ASSESSMENT — PULMONARY FUNCTION TESTS
PIF_VALUE: 0

## 2022-04-05 ASSESSMENT — PAIN SCALES - GENERAL
PAINLEVEL_OUTOF10: 0
PAINLEVEL_OUTOF10: 0

## 2022-04-05 ASSESSMENT — PAIN - FUNCTIONAL ASSESSMENT: PAIN_FUNCTIONAL_ASSESSMENT: 0-10

## 2022-04-05 NOTE — H&P
History and Physical Update    Pt Name: Remi Medina  MRN: [id number]  YOB: 1960  Date of evaluation: 4/5/2022    [x] I have examined the patient and reviewed the H&P/Consult and there are no changes to the patient or plans.     [] I have examined the patient and reviewed the H&P/Consult and have noted the following changes:        NATHANIEL Valladares CNP   Electronically signed 4/5/2022 at 7:39 AM

## 2022-04-05 NOTE — OP NOTE
Operative Note      Patient: Tiki Michelle  YOB: 1960  MRN: [id number]    Date of Procedure: 4/5/2022    Pre-Op Diagnosis: Retinacular cyst left small finger, Left index trigger finger    Post-Op Diagnosis: Same       Procedure:  Left index finger trigger finger release, puncture retinacular cyst small finger  Surgeon(s):  Antionette Cordero MD    Assistant:   * No surgical staff found *    Anesthesia: Monitor Anesthesia Care    Estimated Blood Loss (mL): Minimal    Complications: None    Specimens:   * No specimens in log *    Implants:  * No implants in log *      Drains: * No LDAs found *    Findings: Confirmed    Detailed Description of Procedure: Indications  This 68-year-old female complain of triggering in the left index finger. Tried activity modification pain medication at relief. She also has a small retinacular cyst on the little finger as well. Discussed up she would benefit from releasing of the trigger finger and evaluate the cyst potentially just puncturing it versus excising it. Patient agreed. Narrative  Patient was taken the operating room underwent sedation. Timeout was taken consent was confirmed. Underwent a standard prepping and draping. Started by injecting local anesthetic over the metacarpophalangeal joint of the index finger. We then inject additional local anesthetic and perforated the retinacular cyst.  No cyst could be palpated after injection. After the injection site swelling went down there still was no retinacular cyst I think we have punctured it. We then made an oblique incision of the metacarpophalangeal joint. Protecting the digital nerves. Identified the pulley over the metacarpal phalangeal joint. This was incised and then released proximally and distally using Metzenbaum scissors. Wounds were closed with nylon suture Dermabond dry dressings. Patient's then awakened and returned to the recovery room. Postoperative plan  Weightbearing as tolerated. Dry dressings as necessary. See her in the office in 2 to 3 weeks for clinical exam suture removal.  If he still having trouble with the hand at that point would consider occupational therapy.     Electronically signed by Mary Newman MD on 4/5/2022 at 8:21 AM

## 2022-04-05 NOTE — ANESTHESIA POSTPROCEDURE EVALUATION
Department of Anesthesiology  Postprocedure Note    Patient: Andry Cruz  MRN: [id number]  [place]: 1960  Date of evaluation: 4/5/2022  Time:  8:32 AM     Procedure Summary     Date: 04/05/22 Room / Location: [address] [place] [address]    Anesthesia Start: 0800 Anesthesia Stop: 0407    Procedure: v-EXCISION cyst left small finger & Left index trigger finger release (Left Hand) Diagnosis: (cyst left small finger, Left index trigger finger)    Surgeons: Cuco Posadas MD Responsible Provider:     Anesthesia Type: general, TIVA ASA Status: 3          Anesthesia Type: general, TIVA    Debora Phase I: Debora Score: 10    Debora Phase II: Debora Score: 6    Last vitals: Reviewed and per EMR flowsheets.        Anesthesia Post Evaluation    Patient location during evaluation: PACU  Patient participation: complete - patient participated  Level of consciousness: awake and alert  Pain score: 0  Airway patency: patent  Nausea & Vomiting: no nausea and no vomiting  Complications: no  Cardiovascular status: blood pressure returned to baseline and hemodynamically stable  Respiratory status: acceptable  Hydration status: euvolemic

## 2022-04-05 NOTE — ANESTHESIA PRE PROCEDURE
Department of Anesthesiology  Preprocedure Note       Name:  Tiki Michelle   Age:  63 y.o.  :  1960                                          MRN:  [id number]         Date:  2022      Surgeon: Jen Garcia):  Antionette Cordero MD    Procedure: Procedure(s):  v-EXCISION cyst left small finger & Left index trigger finger release    Medications prior to admission:   Prior to Admission medications    Medication Sig Start Date End Date Taking? Authorizing Provider   Dentifrices (CLOSE-UP DT) Place onto teeth  Patient not taking: Reported on 2022    Historical Provider, MD   colestipol (COLESTID) 1 g tablet TAKE 1 TABLET TWICE A DAY ONE HOUR AFTER OTHER MEDICATIONS AND AT LEAST FOUR HOURS PRIOR TO MEDICATIONS.  MONITOR FOR CONSTIPATION 22   Historical Provider, MD   GOODSENSE ARTHRITIS PAIN 650 MG extended release tablet TAKE (1) TABLET BY MOUTH EVERY 8 HOURS AS NEEDED 21   Historical Provider, MD   losartan (COZAAR) 100 MG tablet 50 mg  21   Historical Provider, MD   aspirin 81 MG EC tablet Take 81 mg by mouth daily    Historical Provider, MD   Biotin 71257 MCG TBDP Take 1 tablet by mouth daily    Historical Provider, MD   carvedilol (COREG) 25 MG tablet Take 25 mg by mouth 2 times daily    Historical Provider, MD   gemfibrozil (LOPID) 600 MG tablet Take 600 mg by mouth 2 times daily 20   Historical Provider, MD   metFORMIN (GLUCOPHAGE) 850 MG tablet Take 500 mg by mouth 2 times daily (with meals)  3/8/21 3/29/22  Historical Provider, MD   lipase-protease-amylase (CREON) 83029-25751 units delayed release capsule Take 1 capsule by mouth 3 times daily (with meals)  2/19/21 3/29/22  Historical Provider, MD   rosuvastatin (CRESTOR) 10 MG tablet Take 10 mg by mouth daily    Historical Provider, MD   Acidophilus Lactobacillus CAPS Take 1 tablet by mouth daily    Historical Provider, MD   Multiple Vitamin (MULTI VITAMIN DAILY PO) Take by mouth    Historical Provider, MD   loratadine (CLARITIN) 10 MG capsule Take 10 mg by mouth as needed    Historical Provider, MD       Current medications:    No current facility-administered medications for this visit. No current outpatient medications on file. Facility-Administered Medications Ordered in Other Visits   Medication Dose Route Frequency Provider Last Rate Last Admin    sodium chloride flush 0.9 % injection 5-40 mL  5-40 mL IntraVENous PRN Kian Sharif MD        lactated ringers infusion   IntraVENous Continuous Kian Sharif  mL/hr at 04/05/22 0730 New Bag at 04/05/22 0730       Allergies: Allergies   Allergen Reactions    Fluticasone Shortness Of Breath and Swelling     Swelling of hands    Hydrocodone-Acetaminophen Itching       Problem List:    Patient Active Problem List   Diagnosis Code    Tear of medial meniscus of right knee, current S83.241A       Past Medical History:        Diagnosis Date    Diabetes mellitus ([address]     GERD (gastroesophageal reflux disease)     Heart murmur     Hyperlipidemia     Hypertension     Mitral valve regurgitation     S/P arthroscopy of right knee        Past Surgical History:        Procedure Laterality Date    BUNIONECTOMY Bilateral     CARPAL TUNNEL RELEASE Bilateral     CHOLECYSTECTOMY      COLONOSCOPY      FINGER TRIGGER RELEASE Left 2010    Left thumb    [facility]    KNEE ARTHROSCOPY Right     w/ menisectomy    KNEE ARTHROSCOPY Right 01/24/2020    KNEE ARTHROSCOPY Right 8/17/2021    Right Knee Arthroscopy, limited synovectomy performed by Kian Sharif MD at [place] [address] History:    Social History     Tobacco Use    Smoking status: Former Smoker     Packs/day: 1.00     Years: 25.00     Pack years: 25.00    Smokeless tobacco: Never Used    Tobacco comment: quit approx. 2011   Substance Use Topics    Alcohol use: No                                Counseling given: Not Answered  Comment: quit approx.  2011      Vital Signs (Current): There were no vitals filed for this visit. BP Readings from Last 3 Encounters:   04/05/22 (!) 122/59   03/29/22 110/70   08/31/21 122/64       NPO Status:                                                                                 BMI:   Wt Readings from Last 3 Encounters:   04/05/22 147 lb 4 oz (66.8 kg)   03/29/22 166 lb (75.3 kg)   08/31/21 166 lb (75.3 kg)     There is no height or weight on file to calculate BMI.    CBC:   Lab Results   Component Value Date    WBC 8.8 06/14/2021    RBC 4.25 06/14/2021    HGB 13.3 06/14/2021    HCT 40.6 06/14/2021    MCV 95.5 06/14/2021    RDW 12.7 06/14/2021     06/14/2021       CMP:   Lab Results   Component Value Date     06/14/2021    K 3.8 06/14/2021     06/14/2021    CO2 27 06/14/2021    BUN 16 06/14/2021    CREATININE 0.87 06/14/2021    GFRAA >60 06/14/2021    LABGLOM >60 06/14/2021    GLUCOSE 180 06/14/2021    CALCIUM 10.1 06/14/2021       POC Tests:   No results for input(s): POCGLU, POCNA, POCK, POCCL, POCBUN, POCHEMO, POCHCT in the last 72 hours. Coags: No results found for: PROTIME, INR, APTT    HCG (If Applicable): No results found for: PREGTESTUR, PREGSERUM, HCG, HCGQUANT     ABGs: No results found for: PHART, PO2ART, [id number], VGF2ORM, BEART, [id number]     Type & Screen (If Applicable):  No results found for: LABABO, LABRH    Drug/Infectious Status (If Applicable):  No results found for: HIV, HEPCAB    COVID-19 Screening (If Applicable): No results found for: COVID19        Anesthesia Evaluation  Patient summary reviewed and Nursing notes reviewed   history of anesthetic complications: PONV.   Airway: Mallampati: II  TM distance: >3 FB   Neck ROM: full  Mouth opening: > = 3 FB Dental: normal exam         Pulmonary:Negative Pulmonary ROS and normal exam                               Cardiovascular:    (+) hypertension:,       ECG reviewed                        Neuro/Psych: Negative Neuro/Psych ROS              GI/Hepatic/Renal:   (+) GERD:,           Endo/Other:    (+) Diabetes, . Abdominal:             Vascular: Other Findings:               Anesthesia Plan      general and TIVA     ASA 3       Induction: intravenous. Anesthetic plan and risks discussed with patient. Use of blood products discussed with whom consented to blood products.                    NATHANIEL Preciado - CRNA   4/5/2022

## 2022-04-19 ENCOUNTER — OFFICE VISIT (OUTPATIENT)
Dept: ORTHOPEDIC SURGERY | Age: 62
End: 2022-04-19
Payer: MEDICARE

## 2022-04-19 VITALS
HEIGHT: 63 IN | HEART RATE: 65 BPM | DIASTOLIC BLOOD PRESSURE: 82 MMHG | BODY MASS INDEX: 26.05 KG/M2 | SYSTOLIC BLOOD PRESSURE: 118 MMHG | WEIGHT: 147 LBS

## 2022-04-19 DIAGNOSIS — M65.30 TRIGGER FINGER OF LEFT HAND, UNSPECIFIED FINGER: Primary | ICD-10-CM

## 2022-04-19 PROCEDURE — 99024 POSTOP FOLLOW-UP VISIT: CPT | Performed by: PHYSICIAN ASSISTANT

## 2022-04-19 PROCEDURE — 99214 OFFICE O/P EST MOD 30 MIN: CPT | Performed by: PHYSICIAN ASSISTANT

## 2022-04-19 NOTE — PROGRESS NOTES
Orthopaedic Progress Note      CHIEF COMPLAINT:  Left 5th digit trigger finger release    HISTORY OF PRESENT ILLNESS:       Ms. Jimmy Garcia  is a 63 y.o. female  who presents with left 5th digit trigger finger release. Stitches removed today. No catching or locking noted at this time      Past Medical History:    Past Medical History:   Diagnosis Date    Diabetes mellitus ([address]     GERD (gastroesophageal reflux disease)     Heart murmur     Hyperlipidemia     Hypertension     Mitral valve regurgitation     S/P arthroscopy of right knee        Past Surgical History:    Past Surgical History:   Procedure Laterality Date    BUNIONECTOMY Bilateral     CARPAL TUNNEL RELEASE Bilateral     CHOLECYSTECTOMY      COLONOSCOPY      FINGER TRIGGER RELEASE Left 2010    Left thumb    HAND SURGERY Left 4/5/2022    v-EXCISION cyst left small finger & Left index trigger finger release performed by Shiela Vann MD at [address] Right     at [facility] approx. 2018    KNEE ARTHROSCOPY Right     w/ menisectomy    KNEE ARTHROSCOPY Right 01/24/2020    KNEE ARTHROSCOPY Right 8/17/2021    Right Knee Arthroscopy, limited synovectomy performed by Shiela Vann MD at [facility] OR         Current  Medications:  Current Outpatient Medications   Medication Sig Dispense Refill    Dentifrices (CLOSE-UP DT) Place onto teeth       colestipol (COLESTID) 1 g tablet TAKE 1 TABLET TWICE A DAY ONE HOUR AFTER OTHER MEDICATIONS AND AT LEAST FOUR HOURS PRIOR TO MEDICATIONS.  MONITOR FOR CONSTIPATION      GOODSENSE ARTHRITIS PAIN 650 MG extended release tablet TAKE (1) TABLET BY MOUTH EVERY 8 HOURS AS NEEDED      losartan (COZAAR) 100 MG tablet 50 mg       aspirin 81 MG EC tablet Take 81 mg by mouth daily      Biotin 42544 MCG TBDP Take 1 tablet by mouth daily      carvedilol (COREG) 25 MG tablet Take 25 mg by mouth 2 times daily      gemfibrozil (LOPID) 600 MG tablet Take 600 mg by mouth 2 times daily  rosuvastatin (CRESTOR) 10 MG tablet Take 10 mg by mouth daily      Acidophilus Lactobacillus CAPS Take 1 tablet by mouth daily      Multiple Vitamin (MULTI VITAMIN DAILY PO) Take by mouth      loratadine (CLARITIN) 10 MG capsule Take 10 mg by mouth as needed      metFORMIN (GLUCOPHAGE) 850 MG tablet Take 500 mg by mouth 2 times daily (with meals)       lipase-protease-amylase (CREON) 15941-83369 units delayed release capsule Take 1 capsule by mouth 3 times daily (with meals)        No current facility-administered medications for this visit. Allergies:  Fluticasone and Hydrocodone-acetaminophen    Social History:   Social History     Tobacco Use   Smoking Status Former Smoker    Packs/day: 1.00    Years: 25.00    Pack years: 25.00   Smokeless Tobacco Never Used   Tobacco Comment    quit approx. 2011     Social History     Substance and Sexual Activity   Alcohol Use No     Social History     Substance and Sexual Activity   Drug Use No       Family History:  History reviewed. No pertinent family history. REVIEW OF SYSTEMS:  Constitutional: Denies any fever, chills. Musculoskeletal: Positive for trigger finger release left 5th digit. PHYSICAL EXAM:  [unfilled]  General appearance:  Alert and oriented x 3. No apparent distress, appears stated age, calm and cooperative. Musculoskeletal:  Full ROM left 5th digit. NVI sensation intact skin incision well healed. DATA:  CBC:   Lab Results   Component Value Date    WBC 8.8 06/14/2021    HGB 13.3 06/14/2021     06/14/2021     BMP:    Lab Results   Component Value Date     06/14/2021    K 3.8 06/14/2021     06/14/2021    CO2 27 06/14/2021    BUN 16 06/14/2021    CREATININE 0.87 06/14/2021    CALCIUM 10.1 06/14/2021    GLUCOSE 180 06/14/2021     PT/INR:  No results found for: PROTIME, INR  Troponin:  No results found for: TROPONINI  No results for input(s): LIPASE, AMYLASE in the last 72 hours.   No results for input(s): AST, ALT, BILIDIR, BILITOT, ALKPHOS in the last 72 hours. Uric Acid:  No components found for: URIC  Urine Culture:  No components found for: CURINE    Radiology:   XR HAND LEFT (MIN 3 VIEWS)    Result Date: 3/29/2022  EXAMINATION: THREE X-RAY VIEWS OF THE LEFT HAND 3/29/2022 7:14 am COMPARISON: None. HISTORY: ORDERING SYSTEM PROVIDED HISTORY: Trigger finger of left hand, unspecified finger TECHNOLOGIST PROVIDED HISTORY: Reason for Exam: Trigger finger of 2nd finger FINDINGS: Soft tissues are within normal limits. There is no acute fracture or dislocation. There is mild joint space narrowing and spurring at the IP joints of the digits. There is mild joint space narrowing and spurring at the triscaphe and 1st carpometacarpal joints. No bony erosion. No acute osseous abnormality in the left hand. Mild scattered degenerative changes. Diagnosis Orders   1. Trigger finger of left hand, unspecified finger           PLAN:  Dressing as needed  S/p trigger finger release, retinacular cyst removal 5th digit left     No orders of the defined types were placed in this encounter.        Procedure:        Electronically signed by Daljit Hanley PA-C on 4/19/2022 at 9:10 AM

## (undated) DEVICE — [ARTHROSCOPY PUMP,  DO NOT USE IF PACKAGE IS DAMAGED,  KEEP DRY,  KEEP AWAY FROM SUNLIGHT,  PROTECT FROM HEAT AND RADIOACTIVE SOURCES.]: Brand: FLOSTEADY

## (undated) DEVICE — SOLUTION IV IRRIG POUR BRL 0.9% SODIUM CHL 2F7124

## (undated) DEVICE — PACK,ARTHROSCOPY I,SIRUS: Brand: MEDLINE

## (undated) DEVICE — GLOVE SURG SZ 85 L12IN FNGR THK13MIL WHT ISOLEX POLYISOPRENE

## (undated) DEVICE — GAUZE,SPONGE,4"X4",16PLY,XRAY,STRL,LF: Brand: MEDLINE

## (undated) DEVICE — DRAPE,U/ SHT,SPLIT,PLAS,STERIL: Brand: MEDLINE

## (undated) DEVICE — BANDAGE COBAN 4 IN COMPR W4INXL5YD FOAM COHESIVE QUIK STK SELF ADH SFT

## (undated) DEVICE — COVER LT HNDL BLU PLAS

## (undated) DEVICE — GAUZE,SPONGE,4"X4",12PLY,STERILE,LF,2'S: Brand: MEDLINE

## (undated) DEVICE — PADDING CAST W6INXL4YD COT LO LINTING WYTEX

## (undated) DEVICE — SCRUB DRY SURG EZ SCRUB BRUSH PREOPERATIVE GRN

## (undated) DEVICE — COUNTER NEEDLE 10/20 COUNT DEVON   96/CS

## (undated) DEVICE — NEEDLE SPNL L3.5IN PNK HUB S STL REG WALL FIT STYL W/ QNCKE

## (undated) DEVICE — BANDAGE ELASTIC SELF-CLS EZE BND 3INX5YD

## (undated) DEVICE — SKIN AFFIX SURG ADHESIVE 72/CS 0.55ML: Brand: MEDLINE

## (undated) DEVICE — 4-PORT MANIFOLD: Brand: NEPTUNE 2

## (undated) DEVICE — ZIMMER® STERILE DISPOSABLE TOURNIQUET CUFF WITH PLC, DUAL PORT, SINGLE BLADDER, 24 IN. (61 CM)

## (undated) DEVICE — BANDAGE COMPR W2INXL5YD TAN BRTH SELF ADH WRP W/ HND TEAR

## (undated) DEVICE — PACK PROCEDURE SURG EXTREMITY MIN

## (undated) DEVICE — INTENDED FOR TISSUE SEPARATION, AND OTHER PROCEDURES THAT REQUIRE A SHARP SURGICAL BLADE TO PUNCTURE OR CUT.: Brand: BARD-PARKER ® CARBON RIB-BACK BLADES

## (undated) DEVICE — CHLORAPREP 26ML ORANGE

## (undated) DEVICE — PACK SURG PROC REMINDER N WVN DISPOSABLE BEAC TIME OUT

## (undated) DEVICE — GOWN,AURORA,NON-REINFORCED,2XL: Brand: MEDLINE

## (undated) DEVICE — MARKER W/PRE PRINTED CUSTOM LABEL

## (undated) DEVICE — GAMMEX® NON-LATEX SIZE 7.5, STERILE NEOPRENE POWDER-FREE SURGICAL GLOVE: Brand: GAMMEX

## (undated) DEVICE — TOWEL,OR,DSP,ST,BLUE,STD,4/PK,20PK/CS: Brand: MEDLINE

## (undated) DEVICE — BANDAGE COMPR W6INXL5YD WHT BGE POLY COT M E WRP WV HK AND

## (undated) DEVICE — 35 ML SYRINGE LUER-LOCK TIP: Brand: MONOJECT

## (undated) DEVICE — BANDAGE GZ W2XL75IN ST RAYON POLY CNFRM STRTCH LTWT

## (undated) DEVICE — 3M™ WARMING BLANKET, UPPER BODY, 10 PER CASE, 42268: Brand: BAIR HUGGER™

## (undated) DEVICE — DRESSING,GAUZE,XEROFORM,CURAD,5"X9",ST: Brand: CURAD

## (undated) DEVICE — 1200CC GUARDIAN II: Brand: GUARDIAN

## (undated) DEVICE — SUTURE ETHLN SZ 3-0 L18IN NONABSORBABLE BLK FS-1 L24MM 3/8 663H

## (undated) DEVICE — GUARDIAN LVC: Brand: GUARDIAN

## (undated) DEVICE — PAD,ABDOMINAL,5"X9",ST,LF,25/BX: Brand: MEDLINE INDUSTRIES, INC.

## (undated) DEVICE — TUBING, SUCTION, 3/16" X 20', STRAIGHT: Brand: MEDLINE